# Patient Record
Sex: MALE | Race: WHITE | Employment: OTHER | URBAN - METROPOLITAN AREA
[De-identification: names, ages, dates, MRNs, and addresses within clinical notes are randomized per-mention and may not be internally consistent; named-entity substitution may affect disease eponyms.]

---

## 2017-01-23 ENCOUNTER — TELEPHONE (OUTPATIENT)
Dept: FAMILY MEDICINE CLINIC | Facility: CLINIC | Age: 67
End: 2017-01-23

## 2017-01-23 RX ORDER — NAPROXEN SODIUM 220 MG
1-2 TABLET ORAL AS NEEDED
COMMUNITY
Start: 2008-09-02

## 2017-01-23 RX ORDER — LISINOPRIL 20 MG/1
20 TABLET ORAL 2 TIMES DAILY
COMMUNITY
Start: 2016-03-09 | End: 2017-05-24

## 2017-01-23 NOTE — TELEPHONE ENCOUNTER
Humalog Flexpen is not on formulary. New Medicare plan does not cover. Please change to Humalog Kwikpen.

## 2017-01-24 NOTE — TELEPHONE ENCOUNTER
Pharmacy call with error on script. Script stated 4 times per day. Med was corrected to 45 units with sliding scale per day.

## 2017-02-23 ENCOUNTER — LAB ENCOUNTER (OUTPATIENT)
Dept: LAB | Age: 67
End: 2017-02-23
Attending: FAMILY MEDICINE
Payer: MEDICARE

## 2017-02-23 DIAGNOSIS — E11.9 DIABETES MELLITUS TYPE II, CONTROLLED (HCC): Primary | ICD-10-CM

## 2017-02-23 LAB
EST. AVERAGE GLUCOSE BLD GHB EST-MCNC: 134 MG/DL (ref 68–126)
HBA1C MFR BLD HPLC: 6.3 % (ref ?–5.7)

## 2017-02-23 PROCEDURE — 83036 HEMOGLOBIN GLYCOSYLATED A1C: CPT

## 2017-02-24 ENCOUNTER — TELEPHONE (OUTPATIENT)
Dept: FAMILY MEDICINE CLINIC | Facility: CLINIC | Age: 67
End: 2017-02-24

## 2017-02-24 NOTE — TELEPHONE ENCOUNTER
----- Message from Stephanie Hinkle MD sent at 2/24/2017  7:24 AM CST -----  A1c is good at 6.3. Please notify patient.

## 2017-03-17 ENCOUNTER — OFFICE VISIT (OUTPATIENT)
Dept: FAMILY MEDICINE CLINIC | Facility: CLINIC | Age: 67
End: 2017-03-17

## 2017-03-17 VITALS
TEMPERATURE: 97 F | WEIGHT: 183 LBS | SYSTOLIC BLOOD PRESSURE: 140 MMHG | HEART RATE: 80 BPM | BODY MASS INDEX: 27.11 KG/M2 | HEIGHT: 69 IN | DIASTOLIC BLOOD PRESSURE: 72 MMHG | RESPIRATION RATE: 16 BRPM

## 2017-03-17 DIAGNOSIS — J40 BRONCHITIS: Primary | ICD-10-CM

## 2017-03-17 PROBLEM — M86.9 OSTEOMYELITIS (HCC): Status: ACTIVE | Noted: 2017-03-17

## 2017-03-17 PROBLEM — M99.79 NARROWING OF INTERVERTEBRAL DISC SPACE: Status: ACTIVE | Noted: 2017-03-17

## 2017-03-17 PROCEDURE — 99214 OFFICE O/P EST MOD 30 MIN: CPT | Performed by: FAMILY MEDICINE

## 2017-03-17 RX ORDER — FLUTICASONE PROPIONATE 50 MCG
2 SPRAY, SUSPENSION (ML) NASAL DAILY
COMMUNITY
End: 2017-04-25

## 2017-03-17 RX ORDER — AZITHROMYCIN 250 MG/1
TABLET, FILM COATED ORAL
Qty: 6 TABLET | Refills: 0 | Status: SHIPPED | OUTPATIENT
Start: 2017-03-17 | End: 2017-05-24

## 2017-03-17 NOTE — PROGRESS NOTES
[facility] [place]  PROGRESS NOTE  Chief Complaint:   Patient presents with:  Nasal Congestion  Cough  Sore Throat      HPI:   This is a 78year old male coming in for productive cough and head congestion for the past week. No fever chills.   No dryness.   CARDIOVASCULAR:  Denies chest pain, chest pressure, chest discomfort, palpitations, edema, dyspnea on exertion or at rest.  RESPIRATORY:  See HPI  GASTROINTESTINAL:  Denies abdominal pain, nausea, vomiting, constipation, diarrhea, or blood in sto Depression Screen due on [birth year]02/1950  Diabetes Care Foot Exam due on 03[birth year]02/1950  LDL Control due on 03/02/1950  Diabetes Care Dilated Eye Exam due on 03[birth year]02/1951  Annual Physical due on 03/02/1953  Tobacco Cessation Counseling 2 Years due on 03/02/1963  FIT

## 2017-04-25 NOTE — TELEPHONE ENCOUNTER
Future Appointments  Date Time Provider Kamran Mcdonnell   5/22/2017 8:45 AM REF [place] REF [facility]   5/24/2017 8:00 AM Rogelio Sampson MD [facility] [place] [facility], 04/25/2017, 10:34 AM

## 2017-04-26 RX ORDER — FLUTICASONE PROPIONATE 50 MCG
SPRAY, SUSPENSION (ML) NASAL
Qty: 48 G | Refills: 12 | Status: SHIPPED | OUTPATIENT
Start: 2017-04-26 | End: 2018-10-04

## 2017-05-19 ENCOUNTER — TELEPHONE (OUTPATIENT)
Dept: FAMILY MEDICINE CLINIC | Facility: CLINIC | Age: 67
End: 2017-05-19

## 2017-05-19 DIAGNOSIS — E11.9 CONTROLLED TYPE 2 DIABETES MELLITUS WITHOUT COMPLICATION, WITHOUT LONG-TERM CURRENT USE OF INSULIN (HCC): ICD-10-CM

## 2017-05-19 DIAGNOSIS — Z00.00 HEALTH CARE MAINTENANCE: Primary | ICD-10-CM

## 2017-05-19 NOTE — TELEPHONE ENCOUNTER
----- Message from Sumaya Mcclure sent at 5/19/2017  1:26 PM CDT -----  Regarding: lab orders needed   Patient has lab appointment on 5/22/17 could you please put lab orders in system.         Thanks,   Margo

## 2017-05-19 NOTE — TELEPHONE ENCOUNTER
Please see note from lab. Lab orders needed.     Future Appointments  Date Time Provider Kamran Kecia   5/22/2017 8:45 AM REF [place] REF [facility]   5/24/2017 8:00 AM Kirstin Grullon MD [facility] [place] [facility] [place] [facility] [place]

## 2017-05-20 NOTE — TELEPHONE ENCOUNTER
----- Message from Elodia Jaramillo sent at 5/19/2017  1:26 PM CDT -----  Regarding: lab orders needed   Patient has lab appointment on 5/22/17 could you please put lab orders in system.         Thanks,   Margo

## 2017-05-22 ENCOUNTER — LAB ENCOUNTER (OUTPATIENT)
Dept: LAB | Age: 67
End: 2017-05-22
Attending: FAMILY MEDICINE
Payer: MEDICARE

## 2017-05-22 DIAGNOSIS — E11.9 CONTROLLED TYPE 2 DIABETES MELLITUS WITHOUT COMPLICATION, WITHOUT LONG-TERM CURRENT USE OF INSULIN (HCC): ICD-10-CM

## 2017-05-22 DIAGNOSIS — Z00.00 HEALTH CARE MAINTENANCE: ICD-10-CM

## 2017-05-22 PROCEDURE — 82043 UR ALBUMIN QUANTITATIVE: CPT

## 2017-05-22 PROCEDURE — 80061 LIPID PANEL: CPT

## 2017-05-22 PROCEDURE — 82570 ASSAY OF URINE CREATININE: CPT

## 2017-05-22 PROCEDURE — 36415 COLL VENOUS BLD VENIPUNCTURE: CPT

## 2017-05-22 PROCEDURE — 85025 COMPLETE CBC W/AUTO DIFF WBC: CPT

## 2017-05-22 PROCEDURE — 83036 HEMOGLOBIN GLYCOSYLATED A1C: CPT

## 2017-05-22 PROCEDURE — 80053 COMPREHEN METABOLIC PANEL: CPT

## 2017-05-22 PROCEDURE — 81003 URINALYSIS AUTO W/O SCOPE: CPT

## 2017-05-22 PROCEDURE — 84443 ASSAY THYROID STIM HORMONE: CPT

## 2017-05-24 ENCOUNTER — OFFICE VISIT (OUTPATIENT)
Dept: FAMILY MEDICINE CLINIC | Facility: CLINIC | Age: 67
End: 2017-05-24

## 2017-05-24 VITALS
DIASTOLIC BLOOD PRESSURE: 74 MMHG | RESPIRATION RATE: 16 BRPM | HEART RATE: 86 BPM | BODY MASS INDEX: 27 KG/M2 | TEMPERATURE: 97 F | WEIGHT: 182.5 LBS | SYSTOLIC BLOOD PRESSURE: 140 MMHG

## 2017-05-24 DIAGNOSIS — I10 ESSENTIAL HYPERTENSION: ICD-10-CM

## 2017-05-24 DIAGNOSIS — Z00.00 ENCOUNTER FOR ANNUAL HEALTH EXAMINATION: ICD-10-CM

## 2017-05-24 DIAGNOSIS — Z00.00 HEALTH CARE MAINTENANCE: Primary | ICD-10-CM

## 2017-05-24 DIAGNOSIS — E11.9 CONTROLLED TYPE 2 DIABETES MELLITUS WITHOUT COMPLICATION, WITHOUT LONG-TERM CURRENT USE OF INSULIN (HCC): ICD-10-CM

## 2017-05-24 DIAGNOSIS — J30.9 ALLERGIC RHINITIS, UNSPECIFIED ALLERGIC RHINITIS TRIGGER, UNSPECIFIED RHINITIS SEASONALITY: ICD-10-CM

## 2017-05-24 PROCEDURE — 90732 PPSV23 VACC 2 YRS+ SUBQ/IM: CPT | Performed by: FAMILY MEDICINE

## 2017-05-24 PROCEDURE — 96160 PT-FOCUSED HLTH RISK ASSMT: CPT | Performed by: FAMILY MEDICINE

## 2017-05-24 PROCEDURE — G0438 PPPS, INITIAL VISIT: HCPCS | Performed by: FAMILY MEDICINE

## 2017-05-24 PROCEDURE — 99397 PER PM REEVAL EST PAT 65+ YR: CPT | Performed by: FAMILY MEDICINE

## 2017-05-24 PROCEDURE — G0009 ADMIN PNEUMOCOCCAL VACCINE: HCPCS | Performed by: FAMILY MEDICINE

## 2017-05-24 RX ORDER — LISINOPRIL 20 MG/1
20 TABLET ORAL 2 TIMES DAILY
Qty: 180 TABLET | Refills: 3 | Status: SHIPPED | OUTPATIENT
Start: 2017-05-24 | End: 2018-11-16

## 2017-05-24 NOTE — PROGRESS NOTES
[facility] [place]  PROGRESS NOTE  Chief Complaint:   No chief complaint on file. HPI:   This is a 78year old male coming in for general wellness check and recheck of problems. Overall feeling well.   Diabetes: A1c is improved to 6.1, was 0.350-5.500 mIU/mL   -PSA SCREEN   Result Value Ref Range   Prostate Specific Antigen Screen 1.42 0.01-4.00 ng/mL   -URINALYSIS, ROUTINE   Result Value Ref Range   Urine Color Yellow Yellow   Clarity Urine Hazy (A) Clear   Spec Gravity 1.010 1.001-1.030 Meds:    Current Outpatient Prescriptions:  ONETOUCH LANCETS Does not apply Misc Checking blood sugar 6-8 times a day Disp: 500 each Rfl: 3   lisinopril 20 MG Oral Tab Take 1 tablet (20 mg total) by mouth 2 (two) times daily.  Disp: 180 tablet Rfl: 3   Insu mmHg  Pulse 86  Temp(Src) 97.3 °F (36.3 °C) (Temporal)  Resp 16  Wt 182 lb 8 oz Estimated body mass index is 26.94 kg/(m^2) as calculated from the following:    Height as of 3/17/17: 69\". Weight as of this encounter: 182 lb 8 oz.    Vital signs reviewed Subcutaneous Solution Pen-injector; Inject 45 Units into the skin daily. Sliding scale up to 45 units per day  -     Pneumococcal 23, Adult (Pneumovax) (04846) (Dx V03.82/Z23)        PLAN: Exam is normal.  Continue same medications and healthy lifestyle.

## 2017-05-24 NOTE — PATIENT INSTRUCTIONS
Keven Phillip's SCREENING SCHEDULE   Tests on this list are recommended by your physician but may not be covered, or covered at this frequency, by your insurer. Please check with your insurance carrier before scheduling to verify coverage.     PREVENTATI years- more often if abnormal Colonoscopy,10 Years due on 03/02/2001 Update [facility] if applicable    Flex Sigmoidoscopy Screen  Covered every 5 years No results found for this or any previous visit. No flowsheet data found.      Fecal Occult [place] prescription benefits, but Medicare does not cover unless Medically needed    Zoster (Not covered by Medicare Part B) No orders found for this or any previous visit.  This may be covered with your pharmacy  prescription benefits     Recommended Websites for

## 2017-06-07 RX ORDER — INSULIN DETEMIR 100 [IU]/ML
INJECTION, SOLUTION SUBCUTANEOUS
Qty: 30 ML | Refills: 10 | Status: SHIPPED | OUTPATIENT
Start: 2017-06-07 | End: 2017-11-13

## 2017-06-07 NOTE — TELEPHONE ENCOUNTER
Future Appointments  Date Time Provider Kamran Mcdonnell   8/24/2017 8:15 AM REF [place] REF [facility]     Return in about 1 year (around 5/24/2018).

## 2017-07-05 ENCOUNTER — TELEPHONE (OUTPATIENT)
Dept: FAMILY MEDICINE CLINIC | Facility: CLINIC | Age: 67
End: 2017-07-05

## 2017-07-05 NOTE — TELEPHONE ENCOUNTER
Pt states he has diarrhea for one week. Usually after he eats. We have no appts today and has schedule an appt for tomorrow. I advised pt to eat a BRAT diet and fluids. Pt has appt tomorrow.

## 2017-07-07 ENCOUNTER — OFFICE VISIT (OUTPATIENT)
Dept: FAMILY MEDICINE CLINIC | Facility: CLINIC | Age: 67
End: 2017-07-07

## 2017-07-07 VITALS
HEART RATE: 100 BPM | TEMPERATURE: 98 F | RESPIRATION RATE: 24 BRPM | BODY MASS INDEX: 26.36 KG/M2 | SYSTOLIC BLOOD PRESSURE: 170 MMHG | WEIGHT: 178 LBS | DIASTOLIC BLOOD PRESSURE: 88 MMHG | HEIGHT: 69 IN

## 2017-07-07 DIAGNOSIS — R19.7 DIARRHEA, UNSPECIFIED TYPE: Primary | ICD-10-CM

## 2017-07-07 PROCEDURE — 99213 OFFICE O/P EST LOW 20 MIN: CPT | Performed by: FAMILY MEDICINE

## 2017-07-07 NOTE — PROGRESS NOTES
[facility] [place]  PROGRESS NOTE  Chief Complaint:   Patient presents with:  Diarrhea      HPI:   This is a 78year old male coming in for diarrhea for the past 10 days. Patient denies any recent travel or new foods.   Has had 2-3 loose stools Negative Negative mg/dl   Bilirubin Urine Negative Negative   Ketones Urine Negative Negative mg/dL   Blood Urine Negative Negative   pH Urine 6.0 4.5 - 8.0   Protein Urine Negative Negative mg/dl   Urobilinogen Urine <2.0 0.2 - 2.0 mg/dL   Nitrite Urine N Rfl: 10   ONETOUCH LANCETS Does not apply Misc Checking blood sugar 6-8 times a day Disp: 500 each Rfl: 3   lisinopril 20 MG Oral Tab Take 1 tablet (20 mg total) by mouth 2 (two) times daily.  Disp: 180 tablet Rfl: 3   Insulin Lispro (HUMALOG KWIKPEN) 100 U OVA AND PARASITE W GIARDIA EIA; Future  -     STOOL CULTURE W/SHIGATOXIN; Future        PLAN: Diarrhea which is most likely caused by viral enteritis. Due to being over 1 week in duration, will plan stool studies. Recheck if no improvement.     [address] [facility]

## 2017-07-18 ENCOUNTER — LAB ENCOUNTER (OUTPATIENT)
Dept: LAB | Age: 67
End: 2017-07-18
Attending: FAMILY MEDICINE
Payer: MEDICARE

## 2017-07-18 DIAGNOSIS — R19.7 DIARRHEA, UNSPECIFIED TYPE: ICD-10-CM

## 2017-07-18 PROCEDURE — 87427 SHIGA-LIKE TOXIN AG IA: CPT | Performed by: FAMILY MEDICINE

## 2017-07-18 PROCEDURE — 87493 C DIFF AMPLIFIED PROBE: CPT | Performed by: FAMILY MEDICINE

## 2017-07-18 PROCEDURE — 87209 SMEAR COMPLEX STAIN: CPT | Performed by: FAMILY MEDICINE

## 2017-07-18 PROCEDURE — 87045 FECES CULTURE AEROBIC BACT: CPT | Performed by: FAMILY MEDICINE

## 2017-07-18 PROCEDURE — 87269 GIARDIA AG IF: CPT | Performed by: FAMILY MEDICINE

## 2017-07-18 PROCEDURE — 87046 STOOL CULTR AEROBIC BACT EA: CPT | Performed by: FAMILY MEDICINE

## 2017-07-18 PROCEDURE — 87177 OVA AND PARASITES SMEARS: CPT | Performed by: FAMILY MEDICINE

## 2017-07-20 LAB
OVA AND PARASITE, TRICHROME STAIN: NEGATIVE
OVA AND PARASITE, WET MOUNT: NEGATIVE

## 2017-07-21 ENCOUNTER — TELEPHONE (OUTPATIENT)
Dept: FAMILY MEDICINE CLINIC | Facility: CLINIC | Age: 67
End: 2017-07-21

## 2017-07-21 RX ORDER — CIPROFLOXACIN 500 MG/1
500 TABLET, FILM COATED ORAL 2 TIMES DAILY
Qty: 14 TABLET | Refills: 0 | Status: SHIPPED | OUTPATIENT
Start: 2017-07-21 | End: 2018-04-14

## 2017-07-21 RX ORDER — CIPROFLOXACIN 500 MG/1
500 TABLET, FILM COATED ORAL 2 TIMES DAILY
Qty: 14 TABLET | Refills: 0 | COMMUNITY
Start: 2017-07-21 | End: 2017-07-21

## 2017-07-21 NOTE — TELEPHONE ENCOUNTER
Informed pt to start abx and call with any concerns. Pt will call out office with any concerns. Pt expressed understanding and thanks.

## 2017-07-21 NOTE — TELEPHONE ENCOUNTER
Due to continued diarrhea would like to try him on Cipro 500 mg twice daily for 7 days. If no improvement with this then would need to see gastroenterologist.  Send prescription to pharmacy of choice.

## 2017-07-21 NOTE — TELEPHONE ENCOUNTER
Informed pt of his stool test results. Pt still has diarrhea especially after he eats. Pt has tried the imodium but it does not stop the diarrhea. What is the next step. Please advise.

## 2017-07-25 ENCOUNTER — TELEPHONE (OUTPATIENT)
Dept: FAMILY MEDICINE CLINIC | Facility: CLINIC | Age: 67
End: 2017-07-25

## 2017-08-21 ENCOUNTER — TELEPHONE (OUTPATIENT)
Dept: FAMILY MEDICINE CLINIC | Facility: CLINIC | Age: 67
End: 2017-08-21

## 2017-08-21 RX ORDER — PEN NEEDLE, DIABETIC 32GX 5/32"
NEEDLE, DISPOSABLE MISCELLANEOUS
Qty: 200 EACH | Refills: 12 | Status: SHIPPED | OUTPATIENT
Start: 2017-08-21 | End: 2017-08-31

## 2017-08-21 RX ORDER — LISINOPRIL 40 MG/1
TABLET ORAL
Qty: 90 TABLET | Refills: 3 | Status: SHIPPED | OUTPATIENT
Start: 2017-08-21 | End: 2018-04-14

## 2017-08-21 NOTE — TELEPHONE ENCOUNTER
Pharmacy wanted to give pt lisinopril 20mg bid instead of the 40mg and pt has to cut in half. Pt has different insurance now. Ok to give 20mg bid instead of the 40mg and cut in half.

## 2017-08-22 ENCOUNTER — TELEPHONE (OUTPATIENT)
Dept: FAMILY MEDICINE CLINIC | Facility: CLINIC | Age: 67
End: 2017-08-22

## 2017-08-22 NOTE — TELEPHONE ENCOUNTER
Pt is up to date with pneumonia vaccines. Had Prevnar last yr and had Pneumovax this yr at his 5/24/17 appt.     Please notify pt

## 2017-08-22 NOTE — TELEPHONE ENCOUNTER
when is he due for his 2nd pneumonia shot ?       OTW ( see spouse's inquiry also )    please advise

## 2017-08-24 ENCOUNTER — TELEPHONE (OUTPATIENT)
Dept: FAMILY MEDICINE CLINIC | Facility: CLINIC | Age: 67
End: 2017-08-24

## 2017-08-24 ENCOUNTER — APPOINTMENT (OUTPATIENT)
Dept: LAB | Age: 67
End: 2017-08-24
Attending: FAMILY MEDICINE
Payer: MEDICARE

## 2017-08-24 DIAGNOSIS — E11.9 CONTROLLED TYPE 2 DIABETES MELLITUS WITHOUT COMPLICATION, WITHOUT LONG-TERM CURRENT USE OF INSULIN (HCC): ICD-10-CM

## 2017-08-24 LAB
EST. AVERAGE GLUCOSE BLD GHB EST-MCNC: 126 MG/DL (ref 68–126)
HBA1C MFR BLD HPLC: 6 % (ref ?–5.7)

## 2017-08-24 PROCEDURE — 83036 HEMOGLOBIN GLYCOSYLATED A1C: CPT | Performed by: FAMILY MEDICINE

## 2017-08-24 PROCEDURE — 36415 COLL VENOUS BLD VENIPUNCTURE: CPT | Performed by: FAMILY MEDICINE

## 2017-08-24 NOTE — TELEPHONE ENCOUNTER
----- Message from Afua Chen MD sent at 8/24/2017  4:37 PM CDT -----  A1c is good at 6.0, was 6.1 and 6.3. Please notify patient.

## 2017-08-28 ENCOUNTER — TELEPHONE (OUTPATIENT)
Dept: FAMILY MEDICINE CLINIC | Facility: CLINIC | Age: 67
End: 2017-08-28

## 2017-08-28 DIAGNOSIS — Z79.4 CONTROLLED TYPE 2 DIABETES MELLITUS WITHOUT COMPLICATION, WITH LONG-TERM CURRENT USE OF INSULIN (HCC): Primary | ICD-10-CM

## 2017-08-28 DIAGNOSIS — E11.9 CONTROLLED TYPE 2 DIABETES MELLITUS WITHOUT COMPLICATION, WITH LONG-TERM CURRENT USE OF INSULIN (HCC): Primary | ICD-10-CM

## 2017-08-31 NOTE — TELEPHONE ENCOUNTER
Pt would like a 90 day supply last office visit 5/24/17  Last bw 8/24/17.     Future Appointments  Date Time Provider Kamran Mcdonnell   11/22/2017 8:00 AM REF [place] [facility]F [facility]

## 2017-10-06 RX ORDER — PEN NEEDLE, DIABETIC 32GX 5/32"
NEEDLE, DISPOSABLE MISCELLANEOUS
Qty: 540 EACH | Refills: 3 | Status: SHIPPED | OUTPATIENT
Start: 2017-10-06 | End: 2018-11-15

## 2017-10-06 NOTE — TELEPHONE ENCOUNTER
Future appt:     Your appointments     Date & Time Appointment Department [facility])    Nov 22, 2017  8:00 AM CST Laboratory Visit with REF Tarun Rivero [facility] ([facility] Josh [facility])        Nas[facility] Edouard [facility]  [facility]  [address] [place] [address]

## 2017-11-13 ENCOUNTER — TELEPHONE (OUTPATIENT)
Dept: FAMILY MEDICINE CLINIC | Facility: CLINIC | Age: 67
End: 2017-11-13

## 2017-11-13 NOTE — TELEPHONE ENCOUNTER
Schedule at flu injection appt. Called pharmacy and ok for a 3 month supply of insulin. Pt expressed understanding and thanks.

## 2017-11-13 NOTE — TELEPHONE ENCOUNTER
Pt would like to increase his insulin to a 3 month supply. Pt just ordered insulin and wants me to call to get a 90 day supply. Please advise.

## 2017-11-13 NOTE — TELEPHONE ENCOUNTER
Tell patient that he was seen last year for physical and had Prevnar vaccine.   He needs Pneumovax vaccine this year and is due for physical.  Ejection at the time of the physical.  Please notify patient

## 2017-11-22 ENCOUNTER — APPOINTMENT (OUTPATIENT)
Dept: LAB | Age: 67
End: 2017-11-22
Attending: FAMILY MEDICINE
Payer: MEDICARE

## 2017-11-22 ENCOUNTER — NURSE ONLY (OUTPATIENT)
Dept: FAMILY MEDICINE CLINIC | Facility: CLINIC | Age: 67
End: 2017-11-22

## 2017-11-22 ENCOUNTER — TELEPHONE (OUTPATIENT)
Dept: FAMILY MEDICINE CLINIC | Facility: CLINIC | Age: 67
End: 2017-11-22

## 2017-11-22 DIAGNOSIS — E11.9 CONTROLLED TYPE 2 DIABETES MELLITUS WITHOUT COMPLICATION, WITHOUT LONG-TERM CURRENT USE OF INSULIN (HCC): Primary | ICD-10-CM

## 2017-11-22 DIAGNOSIS — E11.9 CONTROLLED TYPE 2 DIABETES MELLITUS WITHOUT COMPLICATION, WITH LONG-TERM CURRENT USE OF INSULIN (HCC): ICD-10-CM

## 2017-11-22 DIAGNOSIS — Z79.4 CONTROLLED TYPE 2 DIABETES MELLITUS WITHOUT COMPLICATION, WITH LONG-TERM CURRENT USE OF INSULIN (HCC): ICD-10-CM

## 2017-11-22 PROCEDURE — 83036 HEMOGLOBIN GLYCOSYLATED A1C: CPT | Performed by: FAMILY MEDICINE

## 2017-11-22 PROCEDURE — 90653 IIV ADJUVANT VACCINE IM: CPT | Performed by: FAMILY MEDICINE

## 2017-11-22 PROCEDURE — 36415 COLL VENOUS BLD VENIPUNCTURE: CPT | Performed by: FAMILY MEDICINE

## 2017-11-22 PROCEDURE — G0008 ADMIN INFLUENZA VIRUS VAC: HCPCS | Performed by: FAMILY MEDICINE

## 2017-11-22 NOTE — TELEPHONE ENCOUNTER
Your appointments     Date & Time Appointment Department [facility])    Apr 09, 2018  8:00 AM CDT Laboratory Visit with REF Naheed Fatima [facility] ([facility] Ref Lab Paul [facility])    May 14, 2018  8:15 AM CDT Laboratory Visit with [address]Adrian Kapoor L

## 2017-11-24 ENCOUNTER — TELEPHONE (OUTPATIENT)
Dept: FAMILY MEDICINE CLINIC | Facility: CLINIC | Age: 67
End: 2017-11-24

## 2017-11-24 DIAGNOSIS — N40.1 BENIGN PROSTATIC HYPERPLASIA WITH LOWER URINARY TRACT SYMPTOMS, SYMPTOM DETAILS UNSPECIFIED: ICD-10-CM

## 2017-11-24 DIAGNOSIS — E11.9 CONTROLLED TYPE 2 DIABETES MELLITUS WITHOUT COMPLICATION, WITHOUT LONG-TERM CURRENT USE OF INSULIN (HCC): Primary | ICD-10-CM

## 2017-11-24 DIAGNOSIS — I10 ESSENTIAL HYPERTENSION: ICD-10-CM

## 2017-11-24 NOTE — TELEPHONE ENCOUNTER
Your appointments     Date & Time Appointment Department [facility])          May 14, 2018  8:15 AM CDT Laboratory Visit with REF Mary Santoyo [facility] ([facility] Ref [facility])    May 16, 2018  8:30 AM CDT MA Supervisit with MD Greg Frye

## 2017-11-24 NOTE — TELEPHONE ENCOUNTER
----- Message from Dilma Dominguez MD sent at 11/24/2017  8:57 AM CST -----  A1c is same at 6.0 which is very good. Repeat in 3 months.   Notify pt

## 2018-04-14 ENCOUNTER — APPOINTMENT (OUTPATIENT)
Dept: LAB | Age: 68
End: 2018-04-14
Attending: FAMILY MEDICINE
Payer: MEDICARE

## 2018-04-14 ENCOUNTER — OFFICE VISIT (OUTPATIENT)
Dept: FAMILY MEDICINE CLINIC | Facility: CLINIC | Age: 68
End: 2018-04-14

## 2018-04-14 VITALS
WEIGHT: 185.63 LBS | TEMPERATURE: 98 F | BODY MASS INDEX: 27 KG/M2 | DIASTOLIC BLOOD PRESSURE: 70 MMHG | HEART RATE: 90 BPM | OXYGEN SATURATION: 96 % | SYSTOLIC BLOOD PRESSURE: 140 MMHG

## 2018-04-14 DIAGNOSIS — J40 BRONCHITIS: ICD-10-CM

## 2018-04-14 DIAGNOSIS — E11.9 CONTROLLED TYPE 2 DIABETES MELLITUS WITHOUT COMPLICATION, WITHOUT LONG-TERM CURRENT USE OF INSULIN (HCC): ICD-10-CM

## 2018-04-14 DIAGNOSIS — J01.10 ACUTE NON-RECURRENT FRONTAL SINUSITIS: Primary | ICD-10-CM

## 2018-04-14 PROCEDURE — 99213 OFFICE O/P EST LOW 20 MIN: CPT | Performed by: NURSE PRACTITIONER

## 2018-04-14 PROCEDURE — 36415 COLL VENOUS BLD VENIPUNCTURE: CPT | Performed by: FAMILY MEDICINE

## 2018-04-14 PROCEDURE — 83036 HEMOGLOBIN GLYCOSYLATED A1C: CPT | Performed by: FAMILY MEDICINE

## 2018-04-14 RX ORDER — AMOXICILLIN AND CLAVULANATE POTASSIUM 875; 125 MG/1; MG/1
1 TABLET, FILM COATED ORAL 2 TIMES DAILY
Qty: 20 TABLET | Refills: 0 | Status: SHIPPED | OUTPATIENT
Start: 2018-04-14 | End: 2018-04-24

## 2018-04-14 NOTE — PATIENT INSTRUCTIONS
Rest, increase fluids, salt water gargles ,anirudh pot (use distilled water) or saline nasal spray, Mucinex-DM,  Tylenol/Ibuprofen, follow up if symptoms persist or increase.

## 2018-04-14 NOTE — PROGRESS NOTES
CHIEF COMPLAINT:   Patient presents with:  Cough  Body ache and/or chills  Cold      HPI:   Kim Anguiano is a 80year old male who presents to clinic today with complaints of feeling ill for 2.5 weeks- started with cough-   Was in [place]- there were w Years: 0.00         Types: Cigarettes  Smokeless tobacco: Never Used                      Alcohol use:  No                 REVIEW OF SYSTEMS:   GENERAL: See HPI  SKIN: no unusual skin lesions or rashes  HEENT: See HPI  THYROID: normal size, no nodules  NIKO completing full course of antibiotic. Meds & Refills for this Visit:    Signed Prescriptions Disp Refills    Amoxicillin-Pot Clavulanate 875-125 MG Oral Tab 20 tablet 0      Sig: Take 1 tablet by mouth 2 (two) times daily.               Aaliyah Cortes

## 2018-04-18 NOTE — TELEPHONE ENCOUNTER
Tell patient that he just had an A1c done on 4/14/2018. He is coming in next month for physical.  Tell him that we could wait until July 14 to do full labs along with A1c at that time rather than doing routine labs next month.   If this would work out pau

## 2018-04-18 NOTE — TELEPHONE ENCOUNTER
Pt would like to have all his blood done in May (except A1C). Pt will be leaving for [place] soon and not sure if they will be back by July. Please place orders.

## 2018-05-01 ENCOUNTER — TELEPHONE (OUTPATIENT)
Dept: FAMILY MEDICINE CLINIC | Facility: CLINIC | Age: 68
End: 2018-05-01

## 2018-05-01 DIAGNOSIS — Z00.00 HEALTH CARE MAINTENANCE: ICD-10-CM

## 2018-05-01 DIAGNOSIS — E11.9 CONTROLLED TYPE 2 DIABETES MELLITUS WITHOUT COMPLICATION, WITH LONG-TERM CURRENT USE OF INSULIN (HCC): Primary | ICD-10-CM

## 2018-05-01 DIAGNOSIS — Z79.4 CONTROLLED TYPE 2 DIABETES MELLITUS WITHOUT COMPLICATION, WITH LONG-TERM CURRENT USE OF INSULIN (HCC): Primary | ICD-10-CM

## 2018-05-01 NOTE — TELEPHONE ENCOUNTER
Your appointments     Date & Time Appointment Department [facility])    May 14, 2018  8:15 AM CDT Laboratory Visit with REF Beatris Maxwell [facility] ([facility] Ref [facility])    May 16, 2018  2:30 PM CDT MA Supervisit with Drew Hodgson  [facility]

## 2018-05-15 ENCOUNTER — LABORATORY ENCOUNTER (OUTPATIENT)
Dept: LAB | Age: 68
End: 2018-05-15
Attending: FAMILY MEDICINE
Payer: MEDICARE

## 2018-05-15 DIAGNOSIS — E11.9 CONTROLLED TYPE 2 DIABETES MELLITUS WITHOUT COMPLICATION, WITH LONG-TERM CURRENT USE OF INSULIN (HCC): ICD-10-CM

## 2018-05-15 DIAGNOSIS — N40.1 BENIGN PROSTATIC HYPERPLASIA WITH LOWER URINARY TRACT SYMPTOMS, SYMPTOM DETAILS UNSPECIFIED: ICD-10-CM

## 2018-05-15 DIAGNOSIS — E11.9 CONTROLLED TYPE 2 DIABETES MELLITUS WITHOUT COMPLICATION, WITHOUT LONG-TERM CURRENT USE OF INSULIN (HCC): ICD-10-CM

## 2018-05-15 DIAGNOSIS — Z00.00 HEALTH CARE MAINTENANCE: ICD-10-CM

## 2018-05-15 DIAGNOSIS — I10 ESSENTIAL HYPERTENSION: ICD-10-CM

## 2018-05-15 DIAGNOSIS — Z79.4 CONTROLLED TYPE 2 DIABETES MELLITUS WITHOUT COMPLICATION, WITH LONG-TERM CURRENT USE OF INSULIN (HCC): ICD-10-CM

## 2018-05-15 PROCEDURE — 80061 LIPID PANEL: CPT

## 2018-05-15 PROCEDURE — 82306 VITAMIN D 25 HYDROXY: CPT

## 2018-05-15 PROCEDURE — 82570 ASSAY OF URINE CREATININE: CPT

## 2018-05-15 PROCEDURE — 82043 UR ALBUMIN QUANTITATIVE: CPT

## 2018-05-15 PROCEDURE — 84153 ASSAY OF PSA TOTAL: CPT

## 2018-05-15 PROCEDURE — 80053 COMPREHEN METABOLIC PANEL: CPT

## 2018-05-15 PROCEDURE — 85025 COMPLETE CBC W/AUTO DIFF WBC: CPT

## 2018-05-15 PROCEDURE — 84443 ASSAY THYROID STIM HORMONE: CPT

## 2018-05-15 PROCEDURE — 81003 URINALYSIS AUTO W/O SCOPE: CPT

## 2018-05-15 PROCEDURE — 36415 COLL VENOUS BLD VENIPUNCTURE: CPT

## 2018-05-16 ENCOUNTER — OFFICE VISIT (OUTPATIENT)
Dept: FAMILY MEDICINE CLINIC | Facility: CLINIC | Age: 68
End: 2018-05-16

## 2018-05-16 VITALS
HEIGHT: 69 IN | BODY MASS INDEX: 26.69 KG/M2 | RESPIRATION RATE: 16 BRPM | DIASTOLIC BLOOD PRESSURE: 78 MMHG | TEMPERATURE: 98 F | HEART RATE: 84 BPM | WEIGHT: 180.19 LBS | SYSTOLIC BLOOD PRESSURE: 140 MMHG

## 2018-05-16 DIAGNOSIS — Z79.4 CONTROLLED TYPE 2 DIABETES MELLITUS WITHOUT COMPLICATION, WITH LONG-TERM CURRENT USE OF INSULIN (HCC): ICD-10-CM

## 2018-05-16 DIAGNOSIS — Z00.00 ENCOUNTER FOR ANNUAL HEALTH EXAMINATION: ICD-10-CM

## 2018-05-16 DIAGNOSIS — I10 ESSENTIAL HYPERTENSION: ICD-10-CM

## 2018-05-16 DIAGNOSIS — Z00.00 HEALTH CARE MAINTENANCE: Primary | ICD-10-CM

## 2018-05-16 DIAGNOSIS — I73.9 CLAUDICATION OF BOTH LOWER EXTREMITIES (HCC): ICD-10-CM

## 2018-05-16 DIAGNOSIS — N40.1 BENIGN PROSTATIC HYPERPLASIA WITH LOWER URINARY TRACT SYMPTOMS, SYMPTOM DETAILS UNSPECIFIED: ICD-10-CM

## 2018-05-16 DIAGNOSIS — E11.9 CONTROLLED TYPE 2 DIABETES MELLITUS WITHOUT COMPLICATION, WITH LONG-TERM CURRENT USE OF INSULIN (HCC): ICD-10-CM

## 2018-05-16 DIAGNOSIS — J01.10 ACUTE NON-RECURRENT FRONTAL SINUSITIS: ICD-10-CM

## 2018-05-16 PROBLEM — M86.9 OSTEOMYELITIS (HCC): Status: RESOLVED | Noted: 2017-03-17 | Resolved: 2018-05-16

## 2018-05-16 PROCEDURE — 99397 PER PM REEVAL EST PAT 65+ YR: CPT | Performed by: FAMILY MEDICINE

## 2018-05-16 PROCEDURE — G0439 PPPS, SUBSEQ VISIT: HCPCS | Performed by: FAMILY MEDICINE

## 2018-05-16 PROCEDURE — 96160 PT-FOCUSED HLTH RISK ASSMT: CPT | Performed by: FAMILY MEDICINE

## 2018-05-16 NOTE — PATIENT INSTRUCTIONS
Continue with same medications. Arterial Doppler ordered. To try Claritin antihistamine along with Flonase. To stop smoking completely.     Christiano Phillip's SCREENING SCHEDULE   Tests on this list are recommended by your physician but may not be covered, cigarettes in their lifetime   • Anyone with a family history    Colorectal Cancer Screening Covered up to Age 76     Colonoscopy Screen   Covered every 10 years- more often if abnormal Colonoscopy,10 Years due on 03/02/2001 Update Health Maintenance if ap with a cut with metal- TD and TDaP Not covered by Medicare Part B) No orders found for this or any previous visit.  This may be covered with your prescription benefits, but Medicare does not cover unless Medically needed    Zoster (Not covered by Medicare P

## 2018-05-16 NOTE — PROGRESS NOTES
[facility] [place]  PROGRESS NOTE  Chief Complaint:   Patient presents with:  Physical      HPI:   This is a 80year old male coming in for general wellness and recheck of problems.   Patient continues to have fairly good control of his diabetes mg/dL   -ASSAY, THYROID STIM HORMONE   Result Value Ref Range   TSH 4.690 0.350 - 5.500 mIU/mL   -PSA   Result Value Ref Range   PSA 1.74 0.01 - 4.00 ng/mL   -MICROALB/CREAT RATIO, RANDOM URINE   Result Value Ref Range   Microalbumin, Urine 0.57 mg/dL   Cr Monocyte % 6.3 %   Eosinophil % 2.7 %   Basophil % 0.8 %   Immature Granulocyte % 0.3 %       Past Medical History:   Diagnosis Date   • Allergic rhinitis    • Diabetes ([facility] [address]    • Hypertension    • Smoking history      Past Surgical History:  2005: Lora Arana quit: Not Answered  Counseling given: Not Answered       REVIEW OF SYSTEMS:   CONSTITUTIONAL:  Denies unusual weight gain/loss,  EENT:  Eyes:  Denies eye pain, visual loss, blurred vision, double vision or yellow sclerae.  Ears, Nose, Throat:  Denies hearin good turgor. HEART:  Regular rate and rhythm, no murmurs, rubs or gallops. LUNGS: Clear to auscultation bilterally, no rales/rhonchi/wheezing. CHEST: No tenderness. ABDOMEN:  Soft, nondistended, nontender, no masses, no hepatosplenomegaly.   BACK: No te Care Foot Exam due on [birth year]02/1950  Diabetes Care Dilated Eye Exam due on 03[birth year]02/1951  Tobacco Cessation Counseling 2 Years due on 03/02/1963  FIT Colorectal Screening due on 03/02/2001  Colonoscopy,10 Years due on 03/02/2001  Fall Risk Screening due on 05/24

## 2018-05-18 ENCOUNTER — TELEPHONE (OUTPATIENT)
Dept: FAMILY MEDICINE CLINIC | Facility: CLINIC | Age: 68
End: 2018-05-18

## 2018-05-18 NOTE — TELEPHONE ENCOUNTER
Arterial Doppler of both legs show significant arterial narrowing. Discussed with patient. Patient will decide on specialists.

## 2018-05-18 NOTE — TELEPHONE ENCOUNTER
----- Message from Lauro Guzman sent at 5/18/2018  2:59 PM CDT -----  [facility]    Which Dr Oshea Miser >??three in phone book    And has another question you can help her with.    Please call        [address] , 05/18/18, 3:01 PM

## 2018-05-21 ENCOUNTER — TELEPHONE (OUTPATIENT)
Dept: FAMILY MEDICINE CLINIC | Facility: CLINIC | Age: 68
End: 2018-05-21

## 2018-05-21 NOTE — TELEPHONE ENCOUNTER
Please call Dr. Ryder Thorne office. This patient would like to be seen by him for his peripheral vascular disease. Patient will need intervention most likely as he has quite severe vascular disease in his legs. (Had recent arterial Doppler).   Please ask the

## 2018-05-21 NOTE — TELEPHONE ENCOUNTER
Pt wanted an appt with Dr. Karla Gooden. The next availabe appt was 6/28/18 at 10:00. Pt could have seen Dr. Monty Reid tomorrow or next week but pt wanted to see Dr. Karla Gooden. Schedule appt with Dr. Karla Gooden but pt was also put on waiting list for someone to cancel.

## 2018-05-31 ENCOUNTER — TELEPHONE (OUTPATIENT)
Dept: FAMILY MEDICINE CLINIC | Facility: CLINIC | Age: 68
End: 2018-05-31

## 2018-05-31 NOTE — TELEPHONE ENCOUNTER
Macario José stopped in. Pt has appt with Dr. Linwood Villasenor on 6/28/18. Macario José would like to know should pt see another cardiologist sooner. Pt would like to know your opinion. OTW for 6/28/18 or see another cardiologist sooner. Please advise.

## 2018-05-31 NOTE — TELEPHONE ENCOUNTER
Okay to wait to the end of the month for his cardiology appointment. Continue stay active and walk as much as possible.

## 2018-06-14 ENCOUNTER — TELEPHONE (OUTPATIENT)
Dept: FAMILY MEDICINE CLINIC | Facility: CLINIC | Age: 68
End: 2018-06-14

## 2018-06-14 NOTE — TELEPHONE ENCOUNTER
Company wanted to know if we received the fax for supplies. When asking they had the wrong fax number. Provided correct fax number and also informed them Dr. Vasu Almanza is out of the office.

## 2018-08-13 ENCOUNTER — TELEPHONE (OUTPATIENT)
Dept: FAMILY MEDICINE CLINIC | Facility: CLINIC | Age: 68
End: 2018-08-13

## 2018-08-13 DIAGNOSIS — E11.9 CONTROLLED TYPE 2 DIABETES MELLITUS WITHOUT COMPLICATION, WITH LONG-TERM CURRENT USE OF INSULIN (HCC): Primary | ICD-10-CM

## 2018-08-13 DIAGNOSIS — Z79.4 CONTROLLED TYPE 2 DIABETES MELLITUS WITHOUT COMPLICATION, WITH LONG-TERM CURRENT USE OF INSULIN (HCC): Primary | ICD-10-CM

## 2018-08-13 NOTE — TELEPHONE ENCOUNTER
----- Message from Dru Mckeon sent at 8/13/2018  2:19 PM CDT -----  Regarding: lab orders needed   Patient has lab appointment on 8/15/18 could you please put lab orders in system.           Thanks,  Margo

## 2018-08-15 ENCOUNTER — APPOINTMENT (OUTPATIENT)
Dept: LAB | Age: 68
End: 2018-08-15
Attending: FAMILY MEDICINE
Payer: MEDICARE

## 2018-08-15 DIAGNOSIS — Z79.4 CONTROLLED TYPE 2 DIABETES MELLITUS WITHOUT COMPLICATION, WITH LONG-TERM CURRENT USE OF INSULIN (HCC): ICD-10-CM

## 2018-08-15 DIAGNOSIS — E11.9 CONTROLLED TYPE 2 DIABETES MELLITUS WITHOUT COMPLICATION, WITH LONG-TERM CURRENT USE OF INSULIN (HCC): ICD-10-CM

## 2018-08-15 LAB
EST. AVERAGE GLUCOSE BLD GHB EST-MCNC: 131 MG/DL (ref 68–126)
HBA1C MFR BLD HPLC: 6.2 % (ref ?–5.7)

## 2018-08-15 PROCEDURE — 36415 COLL VENOUS BLD VENIPUNCTURE: CPT

## 2018-08-15 PROCEDURE — 83036 HEMOGLOBIN GLYCOSYLATED A1C: CPT

## 2018-08-16 ENCOUNTER — TELEPHONE (OUTPATIENT)
Dept: FAMILY MEDICINE CLINIC | Facility: CLINIC | Age: 68
End: 2018-08-16

## 2018-08-16 DIAGNOSIS — E11.9 CONTROLLED TYPE 2 DIABETES MELLITUS WITHOUT COMPLICATION, WITH LONG-TERM CURRENT USE OF INSULIN (HCC): Primary | ICD-10-CM

## 2018-08-16 DIAGNOSIS — Z79.4 CONTROLLED TYPE 2 DIABETES MELLITUS WITHOUT COMPLICATION, WITH LONG-TERM CURRENT USE OF INSULIN (HCC): Primary | ICD-10-CM

## 2018-08-16 NOTE — TELEPHONE ENCOUNTER
----- Message from Milly Mcdonald MD sent at 8/16/2018  7:04 AM CDT -----  A1c is stable at 6.2, was 6.1. Repeat in 3 months. Lab ordered for 3 months.   Please notify patient

## 2018-08-16 NOTE — TELEPHONE ENCOUNTER
Informed pt of his blood work results. Pt expressed understanding and thanks.     Future Appointments  Date Time Provider Kamran Mcdonnell   11/9/2018 8:00 AM REF [place] REF [facility]

## 2018-08-23 RX ORDER — LISINOPRIL 20 MG/1
TABLET ORAL
Qty: 180 TABLET | Refills: 1 | Status: SHIPPED | OUTPATIENT
Start: 2018-08-23 | End: 2019-03-23

## 2018-08-23 RX ORDER — BLOOD-GLUCOSE METER
1 KIT MISCELLANEOUS 4 TIMES DAILY
Qty: 1 KIT | Refills: 0 | Status: SHIPPED | OUTPATIENT
Start: 2018-08-23 | End: 2019-08-23

## 2018-08-23 NOTE — TELEPHONE ENCOUNTER
Patient is asking for a 90 day supply of the lisinopril. Patient does not have an upcoming appt scheduled     Future appt:     Your appointments     Date & Time Appointment Department [facility])    Nov 09, 2018  8:00 AM CST Laboratory Visit with Mare Walker

## 2018-08-23 NOTE — TELEPHONE ENCOUNTER
Called patient, he is asking for a prescription for a new glucose meter. Asking to get a one touch ultra mini device due to where he is getting his supplies they do not have the kind that he needs.

## 2018-08-23 NOTE — TELEPHONE ENCOUNTER
Prescription for new monitor sent. This patient needs glucose strips for this also? If he does please add this to his prescription list so that it can be sent to the pharmacy.   Should be testing 4 times a day

## 2018-08-24 RX ORDER — BLOOD-GLUCOSE METER
1 KIT MISCELLANEOUS 2 TIMES DAILY
Qty: 1 KIT | Refills: 0 | Status: SHIPPED | OUTPATIENT
Start: 2018-08-24 | End: 2019-08-24

## 2018-10-04 RX ORDER — FLUTICASONE PROPIONATE 50 MCG
SPRAY, SUSPENSION (ML) NASAL
Qty: 48 G | Refills: 3 | Status: SHIPPED | OUTPATIENT
Start: 2018-10-04 | End: 2019-11-11

## 2018-10-04 NOTE — TELEPHONE ENCOUNTER
Future appt:     Your appointments     Date & Time Appointment Department [facility])    Nov 09, 2018  8:00 AM CST Laboratory Visit with REF Wu Elam [facility] ([facility])        Jacob Jha Reference Lab  [facility] Ref [facility] [place]  [address] [place] [address]

## 2018-11-09 ENCOUNTER — APPOINTMENT (OUTPATIENT)
Dept: LAB | Age: 68
End: 2018-11-09
Attending: FAMILY MEDICINE
Payer: MEDICARE

## 2018-11-09 DIAGNOSIS — Z79.4 CONTROLLED TYPE 2 DIABETES MELLITUS WITHOUT COMPLICATION, WITH LONG-TERM CURRENT USE OF INSULIN (HCC): ICD-10-CM

## 2018-11-09 DIAGNOSIS — E11.9 CONTROLLED TYPE 2 DIABETES MELLITUS WITHOUT COMPLICATION, WITH LONG-TERM CURRENT USE OF INSULIN (HCC): ICD-10-CM

## 2018-11-09 PROCEDURE — 83036 HEMOGLOBIN GLYCOSYLATED A1C: CPT

## 2018-11-09 PROCEDURE — 36415 COLL VENOUS BLD VENIPUNCTURE: CPT

## 2018-11-12 ENCOUNTER — TELEPHONE (OUTPATIENT)
Dept: FAMILY MEDICINE CLINIC | Facility: CLINIC | Age: 68
End: 2018-11-12

## 2018-11-12 NOTE — TELEPHONE ENCOUNTER
----- Message from Kathrin Zazueta MD sent at 11/12/2018  6:55 AM CST -----  A1c is excellent at 5.9.   Please notify patient

## 2018-11-15 RX ORDER — PEN NEEDLE, DIABETIC 32GX 5/32"
NEEDLE, DISPOSABLE MISCELLANEOUS
Qty: 540 EACH | Refills: 1 | Status: SHIPPED | OUTPATIENT
Start: 2018-11-15 | End: 2019-06-12

## 2018-11-15 NOTE — TELEPHONE ENCOUNTER
Future appt:     Your appointments     Date & Time Appointment Department [facility])    Nov 16, 2018 11:30 AM CST Exam - Established Patient with Rubi Fernandes MD [facility]        Jay V[place]la

## 2018-11-16 ENCOUNTER — OFFICE VISIT (OUTPATIENT)
Dept: FAMILY MEDICINE CLINIC | Facility: CLINIC | Age: 68
End: 2018-11-16
Payer: COMMERCIAL

## 2018-11-16 ENCOUNTER — TELEPHONE (OUTPATIENT)
Dept: FAMILY MEDICINE CLINIC | Facility: CLINIC | Age: 68
End: 2018-11-16

## 2018-11-16 VITALS
WEIGHT: 188.25 LBS | SYSTOLIC BLOOD PRESSURE: 162 MMHG | HEART RATE: 84 BPM | TEMPERATURE: 97 F | RESPIRATION RATE: 16 BRPM | DIASTOLIC BLOOD PRESSURE: 84 MMHG | BODY MASS INDEX: 27.88 KG/M2 | HEIGHT: 69 IN

## 2018-11-16 DIAGNOSIS — M25.561 ACUTE PAIN OF RIGHT KNEE: ICD-10-CM

## 2018-11-16 DIAGNOSIS — M72.2 PLANTAR FASCIITIS OF RIGHT FOOT: Primary | ICD-10-CM

## 2018-11-16 DIAGNOSIS — M26.609 TMJ (TEMPOROMANDIBULAR JOINT DISORDER): ICD-10-CM

## 2018-11-16 PROCEDURE — 99214 OFFICE O/P EST MOD 30 MIN: CPT | Performed by: FAMILY MEDICINE

## 2018-11-16 RX ORDER — ATORVASTATIN CALCIUM 10 MG/1
1 TABLET, FILM COATED ORAL DAILY
COMMUNITY
Start: 2018-09-23 | End: 2021-03-11

## 2018-11-16 NOTE — PROGRESS NOTES
[facility] [place]  PROGRESS NOTE  Chief Complaint:   Patient presents with:  Heel Pain: Right foot - hurts to walk on      HPI:   This is a 80year old male coming in for several problems.   Patient has had pain over the right heel for the past 8 TIMES DAILYNPI [id number] insulinDX: E11.9 Disp: 700 strip Rfl: 1   BD PEN NEEDLE ANJELICA U/F 32G X 4 MM Does not apply Misc USE 6 TIMES PER DAY Disp: 540 each Rfl: 1   FLUTICASONE PROPIONATE 50 MCG/ACT Nasal Suspension INHALE 2 SPRAYS IN EACH NOSTRIL SAIGE control. HEMATOLOGIC:  Denies anemia, bleeding or bruising. LYMPHATICS:  Denies enlarged nodes   PSYCHIATRIC:  Denies depression or anxiety.       EXAM:   BP (!) 162/84 (BP Location: Left arm, Patient Position: Sitting, Cuff Size: adult)   Pulse 84   Temp due on 05/24/2018  Influenza Vaccine(1) due on 09/01/2018    Patient/Caregiver Education: Patient/Caregiver Education: There are no barriers to learning. Medical education done. Outcome: Patient verbalizes understanding.  Patient is notified to call with

## 2018-11-16 NOTE — TELEPHONE ENCOUNTER
Pt has an appt with Dr. Lieutenant Loza 11/19/18 at 10:30 for knee pain. Left message for pt to return my call.

## 2018-12-05 NOTE — TELEPHONE ENCOUNTER
Future appt:    Last Appointment:  11/16/2018  Cholesterol, Total (mg/dL)   Date Value   05/15/2018 196     HDL Cholesterol (mg/dL)   Date Value   05/15/2018 47     LDL Cholesterol (mg/dL)   Date Value   05/15/2018 130 (H)     Triglycerides (mg/dL)   Date

## 2018-12-14 ENCOUNTER — TELEPHONE (OUTPATIENT)
Dept: FAMILY MEDICINE CLINIC | Facility: CLINIC | Age: 68
End: 2018-12-14

## 2018-12-14 NOTE — TELEPHONE ENCOUNTER
Faxed last px to the fax number provided. Pt is in [place] for 6 months and they need to get a dr down in [place] just in case. Informed wife if they want more records they need to sign a release form and medical records will need take care of this.

## 2019-02-04 ENCOUNTER — TELEPHONE (OUTPATIENT)
Dept: FAMILY MEDICINE CLINIC | Facility: CLINIC | Age: 69
End: 2019-02-04

## 2019-02-04 DIAGNOSIS — I10 ESSENTIAL HYPERTENSION: ICD-10-CM

## 2019-02-04 DIAGNOSIS — E11.9 CONTROLLED TYPE 2 DIABETES MELLITUS WITHOUT COMPLICATION, WITHOUT LONG-TERM CURRENT USE OF INSULIN (HCC): Primary | ICD-10-CM

## 2019-02-04 DIAGNOSIS — R35.1 BENIGN PROSTATIC HYPERPLASIA WITH NOCTURIA: ICD-10-CM

## 2019-02-04 DIAGNOSIS — N40.1 BENIGN PROSTATIC HYPERPLASIA WITH NOCTURIA: ICD-10-CM

## 2019-02-04 NOTE — TELEPHONE ENCOUNTER
Scheduled his appointments, needs orders for his A1C and his fasting lab.   May 20th is his MA Yanick Pruett

## 2019-03-05 ENCOUNTER — APPOINTMENT (OUTPATIENT)
Dept: LAB | Age: 69
End: 2019-03-05
Attending: FAMILY MEDICINE
Payer: MEDICARE

## 2019-03-05 ENCOUNTER — TELEPHONE (OUTPATIENT)
Dept: FAMILY MEDICINE CLINIC | Facility: CLINIC | Age: 69
End: 2019-03-05

## 2019-03-05 DIAGNOSIS — R35.1 BENIGN PROSTATIC HYPERPLASIA WITH NOCTURIA: ICD-10-CM

## 2019-03-05 DIAGNOSIS — N40.1 BENIGN PROSTATIC HYPERPLASIA WITH NOCTURIA: ICD-10-CM

## 2019-03-05 DIAGNOSIS — I10 ESSENTIAL HYPERTENSION: ICD-10-CM

## 2019-03-05 DIAGNOSIS — E11.9 CONTROLLED TYPE 2 DIABETES MELLITUS WITHOUT COMPLICATION, WITHOUT LONG-TERM CURRENT USE OF INSULIN (HCC): ICD-10-CM

## 2019-03-05 DIAGNOSIS — E11.9 CONTROLLED TYPE 2 DIABETES MELLITUS WITHOUT COMPLICATION, WITHOUT LONG-TERM CURRENT USE OF INSULIN (HCC): Primary | ICD-10-CM

## 2019-03-05 LAB
EST. AVERAGE GLUCOSE BLD GHB EST-MCNC: 128 MG/DL (ref 68–126)
HBA1C MFR BLD HPLC: 6.1 % (ref ?–5.7)

## 2019-03-05 PROCEDURE — 36415 COLL VENOUS BLD VENIPUNCTURE: CPT

## 2019-03-05 PROCEDURE — 83036 HEMOGLOBIN GLYCOSYLATED A1C: CPT

## 2019-03-06 ENCOUNTER — TELEPHONE (OUTPATIENT)
Dept: FAMILY MEDICINE CLINIC | Facility: CLINIC | Age: 69
End: 2019-03-06

## 2019-03-06 DIAGNOSIS — I10 ESSENTIAL HYPERTENSION, BENIGN: ICD-10-CM

## 2019-03-06 DIAGNOSIS — E11.9 DIABETES MELLITUS WITHOUT COMPLICATION (HCC): Primary | ICD-10-CM

## 2019-03-06 DIAGNOSIS — R35.1 NOCTURIA: ICD-10-CM

## 2019-03-06 NOTE — TELEPHONE ENCOUNTER
----- Message from Radha Barber MD sent at 3/6/2019  8:08 AM CST -----  Hemoglobin A1c is 6.1. This is excellent. No changes recommended now. We will recheck it again in 3 months.

## 2019-03-06 NOTE — TELEPHONE ENCOUNTER
Patient informed of below. Expressed understanding. Patient requesting PSA/Vitamin D Level added to Lab Orders for May.   Kamla Miles, 03/06/19, 1:31 PM

## 2019-03-13 NOTE — TELEPHONE ENCOUNTER
Patient is in [place] right now, would like to see a Doctor over there, wants Dr Jarrett Prater to fax all her medical records to [phone] offices phone number is [phone]. Patient has appointment with them on Monday would like  to fax today. Pt informed and verbalized understanding.

## 2019-03-22 NOTE — TELEPHONE ENCOUNTER
Future appt:     Your appointments     Date & Time Appointment Department [facility])    May 17, 2019  8:00 AM CDT Laboratory Visit with REF Beatris Maxwell [facility] ([facility] [place])    May 20, 2019  8:00 AM CDT MA Supervisit with Bob Francisco,

## 2019-03-23 ENCOUNTER — TELEPHONE (OUTPATIENT)
Dept: FAMILY MEDICINE CLINIC | Facility: CLINIC | Age: 69
End: 2019-03-23

## 2019-03-23 RX ORDER — LISINOPRIL 20 MG/1
20 TABLET ORAL 2 TIMES DAILY
Qty: 180 TABLET | Refills: 1 | Status: SHIPPED | OUTPATIENT
Start: 2019-03-23 | End: 2019-05-20

## 2019-03-23 NOTE — TELEPHONE ENCOUNTER
Future Appointments   Date Time Provider Kamran Mcdonnell   5/17/2019  8:00 AM REF [place] [facility]   5/20/2019  8:00 AM Evaristo Pichardo MD [facility] [place] [facility] [place]     Pt is leaving for [place] for 6 weeks.      Pt needs a total of 6 pens

## 2019-03-23 NOTE — TELEPHONE ENCOUNTER
The pharmacy only got 1/2 of his scripts--please call back this morning--they are leaving for [place] Monday.

## 2019-05-17 ENCOUNTER — LABORATORY ENCOUNTER (OUTPATIENT)
Dept: LAB | Age: 69
End: 2019-05-17
Attending: FAMILY MEDICINE
Payer: MEDICARE

## 2019-05-17 DIAGNOSIS — E11.9 DIABETES MELLITUS WITHOUT COMPLICATION (HCC): ICD-10-CM

## 2019-05-17 DIAGNOSIS — N40.1 BENIGN PROSTATIC HYPERPLASIA WITH NOCTURIA: ICD-10-CM

## 2019-05-17 DIAGNOSIS — E11.9 CONTROLLED TYPE 2 DIABETES MELLITUS WITHOUT COMPLICATION, WITHOUT LONG-TERM CURRENT USE OF INSULIN (HCC): ICD-10-CM

## 2019-05-17 DIAGNOSIS — R35.1 NOCTURIA: ICD-10-CM

## 2019-05-17 DIAGNOSIS — R35.1 BENIGN PROSTATIC HYPERPLASIA WITH NOCTURIA: ICD-10-CM

## 2019-05-17 DIAGNOSIS — I10 ESSENTIAL HYPERTENSION, BENIGN: ICD-10-CM

## 2019-05-17 DIAGNOSIS — I10 ESSENTIAL HYPERTENSION: ICD-10-CM

## 2019-05-17 PROCEDURE — 85025 COMPLETE CBC W/AUTO DIFF WBC: CPT

## 2019-05-17 PROCEDURE — 80053 COMPREHEN METABOLIC PANEL: CPT

## 2019-05-17 PROCEDURE — 82570 ASSAY OF URINE CREATININE: CPT

## 2019-05-17 PROCEDURE — 80061 LIPID PANEL: CPT

## 2019-05-17 PROCEDURE — 84443 ASSAY THYROID STIM HORMONE: CPT

## 2019-05-17 PROCEDURE — 82306 VITAMIN D 25 HYDROXY: CPT

## 2019-05-17 PROCEDURE — 82043 UR ALBUMIN QUANTITATIVE: CPT

## 2019-05-17 PROCEDURE — 84550 ASSAY OF BLOOD/URIC ACID: CPT

## 2019-05-17 PROCEDURE — 36415 COLL VENOUS BLD VENIPUNCTURE: CPT

## 2019-05-20 ENCOUNTER — OFFICE VISIT (OUTPATIENT)
Dept: FAMILY MEDICINE CLINIC | Facility: CLINIC | Age: 69
End: 2019-05-20
Payer: COMMERCIAL

## 2019-05-20 VITALS
TEMPERATURE: 98 F | WEIGHT: 188.81 LBS | DIASTOLIC BLOOD PRESSURE: 68 MMHG | OXYGEN SATURATION: 95 % | BODY MASS INDEX: 27.03 KG/M2 | HEIGHT: 70 IN | HEART RATE: 73 BPM | SYSTOLIC BLOOD PRESSURE: 138 MMHG

## 2019-05-20 DIAGNOSIS — M54.50 CHRONIC MIDLINE LOW BACK PAIN WITHOUT SCIATICA: ICD-10-CM

## 2019-05-20 DIAGNOSIS — M72.2 PLANTAR FASCIITIS OF RIGHT FOOT: ICD-10-CM

## 2019-05-20 DIAGNOSIS — G89.29 CHRONIC MIDLINE LOW BACK PAIN WITHOUT SCIATICA: ICD-10-CM

## 2019-05-20 DIAGNOSIS — M17.0 PRIMARY OSTEOARTHRITIS OF BOTH KNEES: ICD-10-CM

## 2019-05-20 DIAGNOSIS — F17.201 TOBACCO DEPENDENCE IN REMISSION: ICD-10-CM

## 2019-05-20 DIAGNOSIS — E11.51 TYPE 2 DIABETES MELLITUS WITH DIABETIC PERIPHERAL ANGIOPATHY WITHOUT GANGRENE, WITH LONG-TERM CURRENT USE OF INSULIN (HCC): ICD-10-CM

## 2019-05-20 DIAGNOSIS — J30.9 ALLERGIC RHINITIS, UNSPECIFIED SEASONALITY, UNSPECIFIED TRIGGER: ICD-10-CM

## 2019-05-20 DIAGNOSIS — Z00.00 ENCOUNTER FOR ANNUAL HEALTH EXAMINATION: Primary | ICD-10-CM

## 2019-05-20 DIAGNOSIS — R35.1 BENIGN PROSTATIC HYPERPLASIA WITH NOCTURIA: ICD-10-CM

## 2019-05-20 DIAGNOSIS — Z79.4 TYPE 2 DIABETES MELLITUS WITH DIABETIC PERIPHERAL ANGIOPATHY WITHOUT GANGRENE, WITH LONG-TERM CURRENT USE OF INSULIN (HCC): ICD-10-CM

## 2019-05-20 DIAGNOSIS — I10 ESSENTIAL HYPERTENSION: ICD-10-CM

## 2019-05-20 DIAGNOSIS — M99.79 NARROWING OF INTERVERTEBRAL DISC SPACE: ICD-10-CM

## 2019-05-20 DIAGNOSIS — N40.1 BENIGN PROSTATIC HYPERPLASIA WITH NOCTURIA: ICD-10-CM

## 2019-05-20 DIAGNOSIS — I73.9 PERIPHERAL VASCULAR DISEASE (HCC): ICD-10-CM

## 2019-05-20 PROCEDURE — G0439 PPPS, SUBSEQ VISIT: HCPCS | Performed by: FAMILY MEDICINE

## 2019-05-20 PROCEDURE — 96160 PT-FOCUSED HLTH RISK ASSMT: CPT | Performed by: FAMILY MEDICINE

## 2019-05-20 PROCEDURE — 99397 PER PM REEVAL EST PAT 65+ YR: CPT | Performed by: FAMILY MEDICINE

## 2019-05-20 RX ORDER — LISINOPRIL 40 MG/1
40 TABLET ORAL
COMMUNITY
Start: 2019-05-06 | End: 2020-04-13

## 2019-05-20 RX ORDER — LORATADINE 10 MG/1
TABLET ORAL
COMMUNITY

## 2019-05-20 NOTE — PATIENT INSTRUCTIONS
Recommended Websites for Advanced Directives    [employer]no. org/publications/Documents/personal_dec. pdf  An information packet, including necessary form from the [address] website. http://www. [place].state. [place]/public/books/adv

## 2019-05-20 NOTE — PROGRESS NOTES
[facility] [place]  PROGRESS NOTE  Chief Complaint:   Patient presents with:  Physical      HPI:   This is a 77year old male coming in for his annual wellness visit. He said that his blood sugars have been doing well.   He has occasional low Ref Range    TSH 4.890 (H) 0.358 - 3.740 mIU/mL   URIC ACID, SERUM   Result Value Ref Range    Uric Acid 4.7 3.5 - 7.2 mg/dL   PSA SCREEN   Result Value Ref Range    Prostate Specific Antigen Screen 1.12 <=4.00 ng/mL   VITAMIN D, 25-HYDROXY   Result Value Mother          at 79 possible heart disease   • No Known Problems Brother         Healthy   • No Known Problems Sister      Allergies:    No Known Allergies        Current Meds:    Current Outpatient Medications:  loratadine 10 MG Oral Tab Take by leonid Denies hearing loss, sneezing, congestion, runny nose or sore throat. INTEGUMENTARY:  Denies rashes, itching, skin lesion, or excessive skin dryness.   CARDIOVASCULAR:  Denies chest pain, chest pressure, chest discomfort, palpitations, edema, dyspnea on e No rashes, no skin lesion, no bruising, good turgor. HEART:  Regular rate and rhythm, no murmurs, rubs or gallops. LUNGS: Clear to auscultation bilterally, no rales/rhonchi/wheezing.   ABDOMEN:  Soft, nondistended, nontender, bowel sounds normal in all 4 trigger  Is mild allergic rhinitis that is often associated with horses. 7. Benign prostatic hyperplasia with nocturia  He has benign prostatic hypertrophy. His prostate is relatively large but he is not having a lot of symptoms now.     8. Primary oste midline low back pain without sciatica      uRsty Lang MD  5/20/2019  8:49 AM

## 2019-05-22 ENCOUNTER — TELEPHONE (OUTPATIENT)
Dept: FAMILY MEDICINE CLINIC | Facility: CLINIC | Age: 69
End: 2019-05-22

## 2019-06-10 ENCOUNTER — APPOINTMENT (OUTPATIENT)
Dept: LAB | Age: 69
End: 2019-06-10
Attending: FAMILY MEDICINE
Payer: MEDICARE

## 2019-06-10 ENCOUNTER — TELEPHONE (OUTPATIENT)
Dept: FAMILY MEDICINE CLINIC | Facility: CLINIC | Age: 69
End: 2019-06-10

## 2019-06-10 DIAGNOSIS — E11.51 TYPE 2 DIABETES MELLITUS WITH DIABETIC PERIPHERAL ANGIOPATHY WITHOUT GANGRENE, WITH LONG-TERM CURRENT USE OF INSULIN (HCC): Primary | ICD-10-CM

## 2019-06-10 DIAGNOSIS — E11.9 CONTROLLED TYPE 2 DIABETES MELLITUS WITHOUT COMPLICATION, WITHOUT LONG-TERM CURRENT USE OF INSULIN (HCC): ICD-10-CM

## 2019-06-10 DIAGNOSIS — Z79.4 TYPE 2 DIABETES MELLITUS WITH DIABETIC PERIPHERAL ANGIOPATHY WITHOUT GANGRENE, WITH LONG-TERM CURRENT USE OF INSULIN (HCC): Primary | ICD-10-CM

## 2019-06-10 PROCEDURE — 36415 COLL VENOUS BLD VENIPUNCTURE: CPT

## 2019-06-10 PROCEDURE — 83036 HEMOGLOBIN GLYCOSYLATED A1C: CPT

## 2019-06-11 ENCOUNTER — TELEPHONE (OUTPATIENT)
Dept: FAMILY MEDICINE CLINIC | Facility: CLINIC | Age: 69
End: 2019-06-11

## 2019-06-11 ENCOUNTER — MED REC SCAN ONLY (OUTPATIENT)
Dept: FAMILY MEDICINE CLINIC | Facility: CLINIC | Age: 69
End: 2019-06-11

## 2019-06-11 RX ORDER — INSULIN DETEMIR 100 [IU]/ML
INJECTION, SOLUTION SUBCUTANEOUS
Qty: 18 ML | Refills: 1 | Status: SHIPPED | OUTPATIENT
Start: 2019-06-11 | End: 2019-08-26 | Stop reason: ALTCHOICE

## 2019-06-11 NOTE — TELEPHONE ENCOUNTER
Future appt: Your appointments     Date & Time Appointment Department [facility])    Nov 04, 2019  8:00 AM CST Laboratory Visit with REF Starlette Nine [facility] ([facility] Ref [facility] [place])            [address] [facility]  ED[facility] [place]  [address]

## 2019-06-11 NOTE — TELEPHONE ENCOUNTER
----- Message from Da Silva Precise sent at 6/11/2019  2:28 PM CDT -----  Returned call [phone]

## 2019-06-11 NOTE — TELEPHONE ENCOUNTER
----- Message from Lizet Moya MD sent at 6/11/2019  9:51 AM CDT -----  Hemoglobin A1c is good at 6.3.

## 2019-06-12 NOTE — TELEPHONE ENCOUNTER
Future appt: Your appointments     Date & Time Appointment Department [facility])    Nov 04, 2019  8:00 AM CST Laboratory Visit with REF [facility] ([facility] [place])            THE [facility]  EDW Ref [facility] [place]  [address]

## 2019-06-13 ENCOUNTER — TELEPHONE (OUTPATIENT)
Dept: FAMILY MEDICINE CLINIC | Facility: CLINIC | Age: 69
End: 2019-06-13

## 2019-06-13 NOTE — TELEPHONE ENCOUNTER
Fax received from [employer] stating patient requests a 90 day supply of his Lispro. Asking for 42 ml (14 pens). Original script given yesterday, 6/12/2019 #3 with 1 RF. Please advise.

## 2019-06-14 ENCOUNTER — TELEPHONE (OUTPATIENT)
Dept: FAMILY MEDICINE CLINIC | Facility: CLINIC | Age: 69
End: 2019-06-14

## 2019-06-14 NOTE — TELEPHONE ENCOUNTER
M/L for patient to return call. Need to confirm Humalog directions with patient.   Palak Hernandez, 06/14/19, 10:09 AM

## 2019-06-14 NOTE — TELEPHONE ENCOUNTER
Received prescription from , would like to know how many times patient is injecting. Would like a call back.

## 2019-06-17 NOTE — TELEPHONE ENCOUNTER
Directions updated per Patient. He has already picked up his Insulin from Pharmacy.   Esme Hamilton, 06/17/19, 3:19 PM

## 2019-06-17 NOTE — TELEPHONE ENCOUNTER
----- Message from Tess Elmore sent at 6/17/2019  1:40 PM CDT -----  Contact: [phone]    [facility]

## 2019-08-05 ENCOUNTER — TELEPHONE (OUTPATIENT)
Dept: FAMILY MEDICINE CLINIC | Facility: CLINIC | Age: 69
End: 2019-08-05

## 2019-08-05 DIAGNOSIS — E11.51 TYPE 2 DIABETES MELLITUS WITH DIABETIC PERIPHERAL ANGIOPATHY WITHOUT GANGRENE, WITH LONG-TERM CURRENT USE OF INSULIN (HCC): Primary | ICD-10-CM

## 2019-08-05 DIAGNOSIS — Z79.4 TYPE 2 DIABETES MELLITUS WITH DIABETIC PERIPHERAL ANGIOPATHY WITHOUT GANGRENE, WITH LONG-TERM CURRENT USE OF INSULIN (HCC): Primary | ICD-10-CM

## 2019-08-16 ENCOUNTER — APPOINTMENT (OUTPATIENT)
Dept: LAB | Age: 69
End: 2019-08-16
Attending: FAMILY MEDICINE
Payer: MEDICARE

## 2019-08-16 PROCEDURE — 36415 COLL VENOUS BLD VENIPUNCTURE: CPT | Performed by: FAMILY MEDICINE

## 2019-08-16 PROCEDURE — 83036 HEMOGLOBIN GLYCOSYLATED A1C: CPT | Performed by: FAMILY MEDICINE

## 2019-08-17 ENCOUNTER — TELEPHONE (OUTPATIENT)
Dept: FAMILY MEDICINE CLINIC | Facility: CLINIC | Age: 69
End: 2019-08-17

## 2019-08-17 NOTE — TELEPHONE ENCOUNTER
----- Message from Camilo Gutierrez MD sent at 8/16/2019  6:01 PM CDT -----  Hemoglobin A1c is 6.1. Continue the same plan.

## 2019-08-26 NOTE — TELEPHONE ENCOUNTER
See Scanned Image from Diabetes Ed. Patient apparently did change from   [place] to [place]. Please advise Rx to Shazia Quintero, 08/26/19, 4:02 PM    Future appt:     Your appointments     Date & Time Appointment Department [facility])    Nov 04, 2019

## 2019-10-29 ENCOUNTER — TELEPHONE (OUTPATIENT)
Dept: FAMILY MEDICINE CLINIC | Facility: CLINIC | Age: 69
End: 2019-10-29

## 2019-10-29 DIAGNOSIS — E11.51 TYPE 2 DIABETES MELLITUS WITH DIABETIC PERIPHERAL ANGIOPATHY WITHOUT GANGRENE, WITH LONG-TERM CURRENT USE OF INSULIN (HCC): Primary | ICD-10-CM

## 2019-10-29 DIAGNOSIS — Z79.4 TYPE 2 DIABETES MELLITUS WITH DIABETIC PERIPHERAL ANGIOPATHY WITHOUT GANGRENE, WITH LONG-TERM CURRENT USE OF INSULIN (HCC): Primary | ICD-10-CM

## 2019-10-29 NOTE — TELEPHONE ENCOUNTER
Please advise A1c Order for [facility].    Future appt:    Last Appointment:  5/20/2019  Cholesterol, Total (mg/dL)   Date Value   05/17/2019 141     HDL Cholesterol (mg/dL)   Date Value   05/17/2019 56     LDL Cholesterol (mg/dL)   Date Value   05/17/2019 67

## 2019-10-29 NOTE — TELEPHONE ENCOUNTER
Patient informed of below. Appt given for Diabetes Follow Up. Delilah Alegre, 10/29/19, 1:00 PM    Future appt:     Your appointments     Date & Time Appointment Department [facility])    Nov 20, 2019  9:00 AM CST Exam - Established with Shahla Zheng MD E

## 2019-11-05 ENCOUNTER — TELEPHONE (OUTPATIENT)
Dept: FAMILY MEDICINE CLINIC | Facility: CLINIC | Age: 69
End: 2019-11-05

## 2019-11-05 NOTE — TELEPHONE ENCOUNTER
Patient wanting to confirm that upcoming visit is not for another Medicare Px. Informed no, this will be for Diabetes Follow Up/agreed.

## 2019-11-06 ENCOUNTER — TELEPHONE (OUTPATIENT)
Dept: FAMILY MEDICINE CLINIC | Facility: CLINIC | Age: 69
End: 2019-11-06

## 2019-11-11 RX ORDER — FLUTICASONE PROPIONATE 50 MCG
SPRAY, SUSPENSION (ML) NASAL
Qty: 3 INHALER | Refills: 1 | Status: SHIPPED | OUTPATIENT
Start: 2019-11-11 | End: 2021-03-12

## 2019-11-11 RX ORDER — PEN NEEDLE, DIABETIC 32GX 5/32"
NEEDLE, DISPOSABLE MISCELLANEOUS
Qty: 600 EACH | Refills: 1 | Status: SHIPPED | OUTPATIENT
Start: 2019-11-11 | End: 2020-03-16

## 2019-11-11 NOTE — TELEPHONE ENCOUNTER
Future appt:     Your appointments     Date & Time Appointment Department [facility])    Nov 20, 2019  9:00 AM CST Follow up - Extended with Joie Mitchell MD [address] [place] [address], [place] ([facility])            [place]

## 2019-11-20 ENCOUNTER — OFFICE VISIT (OUTPATIENT)
Dept: FAMILY MEDICINE CLINIC | Facility: CLINIC | Age: 69
End: 2019-11-20
Payer: COMMERCIAL

## 2019-11-20 VITALS
RESPIRATION RATE: 16 BRPM | WEIGHT: 183.63 LBS | DIASTOLIC BLOOD PRESSURE: 60 MMHG | OXYGEN SATURATION: 94 % | BODY MASS INDEX: 26.29 KG/M2 | SYSTOLIC BLOOD PRESSURE: 120 MMHG | HEIGHT: 70 IN | TEMPERATURE: 97 F | HEART RATE: 87 BPM

## 2019-11-20 DIAGNOSIS — E11.51 TYPE 2 DIABETES MELLITUS WITH DIABETIC PERIPHERAL ANGIOPATHY WITHOUT GANGRENE, WITH LONG-TERM CURRENT USE OF INSULIN (HCC): Primary | ICD-10-CM

## 2019-11-20 DIAGNOSIS — I73.9 PERIPHERAL VASCULAR DISEASE (HCC): ICD-10-CM

## 2019-11-20 DIAGNOSIS — M17.0 PRIMARY OSTEOARTHRITIS OF BOTH KNEES: ICD-10-CM

## 2019-11-20 DIAGNOSIS — F17.200 TOBACCO DEPENDENCE: ICD-10-CM

## 2019-11-20 DIAGNOSIS — I10 ESSENTIAL HYPERTENSION, BENIGN: ICD-10-CM

## 2019-11-20 DIAGNOSIS — Z79.4 TYPE 2 DIABETES MELLITUS WITH DIABETIC PERIPHERAL ANGIOPATHY WITHOUT GANGRENE, WITH LONG-TERM CURRENT USE OF INSULIN (HCC): Primary | ICD-10-CM

## 2019-11-20 PROCEDURE — 99214 OFFICE O/P EST MOD 30 MIN: CPT | Performed by: FAMILY MEDICINE

## 2019-11-20 NOTE — PROGRESS NOTES
[place] MEDICAL [facility] [place]  PROGRESS NOTE  Chief Complaint:   Patient presents with:  Diabetes  Follow - Up      HPI:   This is a 77year old male coming in for a diabetes follow-up.   He said that his blood pressure has been going up and down at rehab b • Smoking history      Past Surgical History:   Procedure Laterality Date   • Paulo Colon  [id number]    Complicated by postop osteomyelitis     Social History:  Social History    Tobacco Use      Smoking status: Former Smoker        Types: Cigarettes Insulin Aspart, w/Niacinamide, (FIASP FLEXTOUCH) 100 UNIT/ML Subcutaneous Solution Pen-injector Sliding scale up to 45 units daily; use tid ac. 5 pen 5      Counseling given: Not Answered       REVIEW OF SYSTEMS:   CONSTITUTIONAL: He feels like he is doing bilaterally, no eye discharge Ears: External normal. Nose: patent, no nasal discharge Throat:  No tonsillar erythema or exudate. Mouth:  No oral lesions or ulcerations, good dentition. NECK: Supple, no CLAD, no JVD, no thyromegaly.   SKIN: No rashes, no s No prescriptions requested or ordered in this encounter       Health Maintenance: There are no preventive care reminders to display for this patient. Patient/Caregiver Education: Patient/Caregiver Education: There are no barriers to learning.  Medical e

## 2019-11-25 NOTE — TELEPHONE ENCOUNTER
Future appt:     Your appointments     Date & Time Appointment Department [facility])    May 13, 2020  8:00 AM CDT MA Supervisit with Sonya Mae MD [address] [place] [address] Jeremy [place] ([facility]            Cristino Cohngo

## 2019-11-27 ENCOUNTER — OFFICE VISIT (OUTPATIENT)
Dept: FAMILY MEDICINE CLINIC | Facility: CLINIC | Age: 69
End: 2019-11-27
Payer: COMMERCIAL

## 2019-11-27 VITALS
TEMPERATURE: 98 F | HEART RATE: 77 BPM | WEIGHT: 182 LBS | OXYGEN SATURATION: 98 % | SYSTOLIC BLOOD PRESSURE: 134 MMHG | BODY MASS INDEX: 26.05 KG/M2 | HEIGHT: 70 IN | DIASTOLIC BLOOD PRESSURE: 66 MMHG

## 2019-11-27 DIAGNOSIS — R05.9 COUGH: ICD-10-CM

## 2019-11-27 DIAGNOSIS — R09.81 NASAL CONGESTION: Primary | ICD-10-CM

## 2019-11-27 PROCEDURE — 99213 OFFICE O/P EST LOW 20 MIN: CPT | Performed by: NURSE PRACTITIONER

## 2019-11-27 RX ORDER — AMOXICILLIN AND CLAVULANATE POTASSIUM 875; 125 MG/1; MG/1
1 TABLET, FILM COATED ORAL 2 TIMES DAILY
Qty: 20 TABLET | Refills: 0 | Status: SHIPPED | OUTPATIENT
Start: 2019-11-27 | End: 2019-12-07

## 2019-11-27 NOTE — PATIENT INSTRUCTIONS
Wait and see  approach     Viral in etiology, if symmptoms worsen or if cherry debeolop facial pain, ear pain, begin taking antibiotoc     Augmentin sent to pharmacy   Take acetaminophen or ibuprofen for fever/discomfort  Drink plenty of fluids, warm liquids including inside the mouth  · seizures  · trouble passing urine or change in the amount of urine  · unusual bleeding, bruising  · unusually weak or tired  · white patches or sores in the mouth or throat  Side effects that usually do not require medical att method of birth control. NOTE:This sheet is a summary. It may not cover all possible information. If you have questions about this medicine, talk to your doctor, pharmacist, or health care provider.  Copyright© 2019 Elsevier

## 2019-11-27 NOTE — PROGRESS NOTES
HPI:    Patient ID: Khoa Rees is a 77year old male. Patient presents to the clinic today with complaints of cough, sneezing, congestion, and body aches since Sunday.  No facial pain, no ear pain, no chills, no fever , no sob, nowheezing, and no ch loratadine 10 MG Oral Tab Take by mouth. • lisinopril 40 MG Oral Tab Take 40 mg by mouth. • atorvastatin 10 MG Oral Tab Take 1 tablet by mouth daily. • aspirin 81 MG Oral Tab Take 81 mg by mouth daily.      • ONETOUCH LANCETS Does not apply Misc Referrals:  None       #[id number]

## 2019-12-04 RX ORDER — INSULIN DEGLUDEC 200 U/ML
INJECTION, SOLUTION SUBCUTANEOUS
Qty: 15 ML | Refills: 5 | Status: SHIPPED | OUTPATIENT
Start: 2019-12-04 | End: 2020-03-16

## 2019-12-04 NOTE — TELEPHONE ENCOUNTER
Future Appointments   Date Time Provider Kamran Mcdonnell   5/13/2020  8:00 AM Heaven Cummins MD [facility] [place] [facility]

## 2019-12-29 ENCOUNTER — PATIENT MESSAGE (OUTPATIENT)
Dept: FAMILY MEDICINE CLINIC | Facility: CLINIC | Age: 69
End: 2019-12-29

## 2019-12-30 NOTE — TELEPHONE ENCOUNTER
From: Phillip Joseph  To: Ulisses Bernstein MD  Sent: 12/29/2019 10:58 AM CST  Subject: Other    i saw the letter about the colon screen. I had a colonoscopy in 2016 with Dr. Charles Cruz at [facility] and at that time I was cleared for 10 years.  I will reschedule w

## 2020-02-24 ENCOUNTER — TELEPHONE (OUTPATIENT)
Dept: FAMILY MEDICINE CLINIC | Facility: CLINIC | Age: 70
End: 2020-02-24

## 2020-02-24 DIAGNOSIS — I73.9 PERIPHERAL VASCULAR DISEASE (HCC): ICD-10-CM

## 2020-02-24 DIAGNOSIS — R35.1 BENIGN PROSTATIC HYPERPLASIA WITH NOCTURIA: ICD-10-CM

## 2020-02-24 DIAGNOSIS — I10 ESSENTIAL HYPERTENSION, BENIGN: ICD-10-CM

## 2020-02-24 DIAGNOSIS — E11.51 TYPE 2 DIABETES MELLITUS WITH DIABETIC PERIPHERAL ANGIOPATHY WITHOUT GANGRENE, WITH LONG-TERM CURRENT USE OF INSULIN (HCC): Primary | ICD-10-CM

## 2020-02-24 DIAGNOSIS — Z79.4 TYPE 2 DIABETES MELLITUS WITH DIABETIC PERIPHERAL ANGIOPATHY WITHOUT GANGRENE, WITH LONG-TERM CURRENT USE OF INSULIN (HCC): Primary | ICD-10-CM

## 2020-02-24 DIAGNOSIS — N40.1 BENIGN PROSTATIC HYPERPLASIA WITH NOCTURIA: ICD-10-CM

## 2020-02-24 LAB — HEMOGLOBIN A1C: 5.8

## 2020-02-24 NOTE — TELEPHONE ENCOUNTER
Patient informed of below. Expressed understanding.     Lab orders for May faxed to Alek Gastelum, 02/24/20, 3:31 PM

## 2020-02-24 NOTE — TELEPHONE ENCOUNTER
Please call Debi Chavez. His A1c is 5.8. This is an improvement over his A1c 3 months ago. Please keep up the good work with the insulin and regular exercise. Plan on returning for a Medicare wellness visit in May. Orders have been entered for that visit.

## 2020-03-16 ENCOUNTER — TELEPHONE (OUTPATIENT)
Dept: FAMILY MEDICINE CLINIC | Facility: CLINIC | Age: 70
End: 2020-03-16

## 2020-03-16 DIAGNOSIS — Z79.4 TYPE 2 DIABETES MELLITUS WITH DIABETIC PERIPHERAL ANGIOPATHY WITHOUT GANGRENE, WITH LONG-TERM CURRENT USE OF INSULIN (HCC): Primary | ICD-10-CM

## 2020-03-16 DIAGNOSIS — E11.51 TYPE 2 DIABETES MELLITUS WITH DIABETIC PERIPHERAL ANGIOPATHY WITHOUT GANGRENE, WITH LONG-TERM CURRENT USE OF INSULIN (HCC): Primary | ICD-10-CM

## 2020-03-16 NOTE — TELEPHONE ENCOUNTER
Requesting refill for Tresiba sent to [address].  Please call back to confirm refill was sent

## 2020-04-13 DIAGNOSIS — Z79.4 TYPE 2 DIABETES MELLITUS WITH DIABETIC PERIPHERAL ANGIOPATHY WITHOUT GANGRENE, WITH LONG-TERM CURRENT USE OF INSULIN (HCC): ICD-10-CM

## 2020-04-13 DIAGNOSIS — E11.51 TYPE 2 DIABETES MELLITUS WITH DIABETIC PERIPHERAL ANGIOPATHY WITHOUT GANGRENE, WITH LONG-TERM CURRENT USE OF INSULIN (HCC): ICD-10-CM

## 2020-04-13 DIAGNOSIS — I10 ESSENTIAL HYPERTENSION, BENIGN: Primary | ICD-10-CM

## 2020-04-13 RX ORDER — INSULIN LISPRO 100 [IU]/ML
45 INJECTION, SOLUTION INTRAVENOUS; SUBCUTANEOUS DAILY
Qty: 15 PEN | Refills: 0 | Status: SHIPPED | OUTPATIENT
Start: 2020-04-13 | End: 2020-05-06

## 2020-04-13 RX ORDER — LISINOPRIL 40 MG/1
40 TABLET ORAL DAILY
Qty: 90 TABLET | Refills: 0 | Status: SHIPPED | OUTPATIENT
Start: 2020-04-13 | End: 2021-03-11

## 2020-04-13 RX ORDER — INSULIN LISPRO 100 [IU]/ML
45 INJECTION, SOLUTION INTRAVENOUS; SUBCUTANEOUS DAILY
COMMUNITY
End: 2020-04-13

## 2020-04-13 NOTE — TELEPHONE ENCOUNTER
Patient is needing medication refill     Medication:     Humalog 100 unit     3D Pen Needles (32 gauge)     One touch Ultra Test Strips     Linsinopril 40 mg         [facility] pharmacy in [place], [place].

## 2020-04-13 NOTE — TELEPHONE ENCOUNTER
Future appt:     Your appointments     Date & Time Appointment Department [facility])    May 13, 2020  8:00 AM CDT MA Supervisit with Tommy Mohr MD [address] [place] [address], Stevphen Canavan ([facility]

## 2020-05-06 DIAGNOSIS — E11.51 TYPE 2 DIABETES MELLITUS WITH DIABETIC PERIPHERAL ANGIOPATHY WITHOUT GANGRENE, WITH LONG-TERM CURRENT USE OF INSULIN (HCC): ICD-10-CM

## 2020-05-06 DIAGNOSIS — Z79.4 TYPE 2 DIABETES MELLITUS WITH DIABETIC PERIPHERAL ANGIOPATHY WITHOUT GANGRENE, WITH LONG-TERM CURRENT USE OF INSULIN (HCC): ICD-10-CM

## 2020-05-06 RX ORDER — INSULIN LISPRO 100 [IU]/ML
INJECTION, SOLUTION INTRAVENOUS; SUBCUTANEOUS
Qty: 15 PEN | Refills: 0 | Status: SHIPPED | OUTPATIENT
Start: 2020-05-06 | End: 2020-07-14

## 2020-05-06 NOTE — TELEPHONE ENCOUNTER
Future appt:     Your appointments     Date & Time Appointment Department [facility])    Jun 03, 2020 10:30 AM CDT MA Supervisit with Shahla Zheng MD [address], [facility] ([address] [place] [address])            [facility]

## 2020-05-12 ENCOUNTER — TELEPHONE (OUTPATIENT)
Dept: FAMILY MEDICINE CLINIC | Facility: CLINIC | Age: 70
End: 2020-05-12

## 2020-05-12 NOTE — TELEPHONE ENCOUNTER
It looks like he already has orders that were ordered 2/24/20 to [facility]. The only lab that was completed was the A1c. Please advise.

## 2020-05-12 NOTE — TELEPHONE ENCOUNTER
The labs that were ordered on February 24, 2020 on the ones that should be completed now. Please use those orders.

## 2020-05-27 DIAGNOSIS — E11.51 TYPE 2 DIABETES MELLITUS WITH DIABETIC PERIPHERAL ANGIOPATHY WITHOUT GANGRENE, WITH LONG-TERM CURRENT USE OF INSULIN (HCC): ICD-10-CM

## 2020-05-27 DIAGNOSIS — Z79.4 TYPE 2 DIABETES MELLITUS WITH DIABETIC PERIPHERAL ANGIOPATHY WITHOUT GANGRENE, WITH LONG-TERM CURRENT USE OF INSULIN (HCC): ICD-10-CM

## 2020-05-27 NOTE — TELEPHONE ENCOUNTER
Howe requesting Rx for 3 pens(full box)  Cannot give partial box any longer due to   [facility].   Arlene Power, 05/27/20, 2:39 PM

## 2020-06-03 ENCOUNTER — OFFICE VISIT (OUTPATIENT)
Dept: FAMILY MEDICINE CLINIC | Facility: CLINIC | Age: 70
End: 2020-06-03
Payer: COMMERCIAL

## 2020-06-03 VITALS
OXYGEN SATURATION: 95 % | SYSTOLIC BLOOD PRESSURE: 122 MMHG | HEIGHT: 70 IN | WEIGHT: 177 LBS | DIASTOLIC BLOOD PRESSURE: 62 MMHG | HEART RATE: 85 BPM | BODY MASS INDEX: 25.34 KG/M2 | RESPIRATION RATE: 18 BRPM | TEMPERATURE: 98 F

## 2020-06-03 DIAGNOSIS — I73.9 PERIPHERAL VASCULAR DISEASE (HCC): ICD-10-CM

## 2020-06-03 DIAGNOSIS — E11.51 TYPE 2 DIABETES MELLITUS WITH DIABETIC PERIPHERAL ANGIOPATHY WITHOUT GANGRENE, WITH LONG-TERM CURRENT USE OF INSULIN (HCC): ICD-10-CM

## 2020-06-03 DIAGNOSIS — G89.29 CHRONIC MIDLINE LOW BACK PAIN WITHOUT SCIATICA: ICD-10-CM

## 2020-06-03 DIAGNOSIS — R35.1 BENIGN PROSTATIC HYPERPLASIA WITH NOCTURIA: ICD-10-CM

## 2020-06-03 DIAGNOSIS — F17.200 TOBACCO DEPENDENCE: ICD-10-CM

## 2020-06-03 DIAGNOSIS — J30.9 ALLERGIC RHINITIS, UNSPECIFIED SEASONALITY, UNSPECIFIED TRIGGER: ICD-10-CM

## 2020-06-03 DIAGNOSIS — M17.0 PRIMARY OSTEOARTHRITIS OF BOTH KNEES: ICD-10-CM

## 2020-06-03 DIAGNOSIS — M99.79 NARROWING OF INTERVERTEBRAL DISC SPACE: ICD-10-CM

## 2020-06-03 DIAGNOSIS — N40.1 BENIGN PROSTATIC HYPERPLASIA WITH NOCTURIA: ICD-10-CM

## 2020-06-03 DIAGNOSIS — M54.50 CHRONIC MIDLINE LOW BACK PAIN WITHOUT SCIATICA: ICD-10-CM

## 2020-06-03 DIAGNOSIS — Z79.4 TYPE 2 DIABETES MELLITUS WITH DIABETIC PERIPHERAL ANGIOPATHY WITHOUT GANGRENE, WITH LONG-TERM CURRENT USE OF INSULIN (HCC): ICD-10-CM

## 2020-06-03 DIAGNOSIS — I10 ESSENTIAL HYPERTENSION, BENIGN: ICD-10-CM

## 2020-06-03 DIAGNOSIS — Z00.00 ENCOUNTER FOR ANNUAL HEALTH EXAMINATION: Primary | ICD-10-CM

## 2020-06-03 PROBLEM — M72.2 PLANTAR FASCIITIS OF RIGHT FOOT: Status: RESOLVED | Noted: 2018-11-15 | Resolved: 2020-06-03

## 2020-06-03 PROCEDURE — 96160 PT-FOCUSED HLTH RISK ASSMT: CPT | Performed by: FAMILY MEDICINE

## 2020-06-03 PROCEDURE — G0439 PPPS, SUBSEQ VISIT: HCPCS | Performed by: FAMILY MEDICINE

## 2020-06-03 PROCEDURE — 99397 PER PM REEVAL EST PAT 65+ YR: CPT | Performed by: FAMILY MEDICINE

## 2020-06-03 NOTE — PROGRESS NOTES
HPI:   Ubaldo Manzo is a 72year old male who presents for a Medicare Annual Wellness Visit.       His last annual assessment has been over 1 year: Annual Physical due on 05/20/2020       He is doing a good job of managing his blood sugars with his insuli knees     Chronic midline low back pain without sciatica    Wt Readings from Last 3 Encounters:  06/03/20 : 177 lb (80.3 kg)  11/27/19 : 182 lb (82.6 kg)  11/20/19 : 183 lb 9.6 oz (83.3 kg)     Last Cholesterol Labs:   Lab Results   Component Value Date history that includes laminectomy,lumbar (2005). His family history includes CAD in his father and mother; No Known Problems in his brother and sister. SOCIAL HISTORY:   He  reports that he has quit smoking. His smoking use included cigarettes.  He has rhythm. Pulses: Normal pulses. Heart sounds: Normal heart sounds. Pulmonary:      Effort: Pulmonary effort is normal.      Breath sounds: Normal breath sounds. Abdominal:      General: Abdomen is flat.  Bowel sounds are normal.   Musculoskelet patient indicates understanding of these issues and agrees to the plan. Labs reviewed. Continue the same medications. Please work on quitting smoking. Continue to exercise regularly. Check hemoglobin A1c in 3 months.   Reschedule office visit in 6 mo on 05/27/2022  Update Health Maintenance if applicable     Immunizations (Update Immunization Activity if applicable)     Influenza  Covered Annually 10/30/2019   Please get every year    Pneumococcal 13 (Prevnar)  Covered Once after 65 05/13/2016 Please g data found. Dilated Eye exam  Annually Data entered on: 5/22/2019   Last Dilated Eye Exam 5/16/2019     No flowsheet data found.

## 2020-06-03 NOTE — PATIENT INSTRUCTIONS
Keep up the good work. Please work on quitting smoking. Recommended Websites for Advanced Directives    [email or web address] org/publications/Documents/personal_dec. pdf  An information packet, including necessary form from the [employer] w

## 2020-06-16 ENCOUNTER — TELEPHONE (OUTPATIENT)
Dept: FAMILY MEDICINE CLINIC | Facility: CLINIC | Age: 70
End: 2020-06-16

## 2020-06-16 DIAGNOSIS — Z79.4 TYPE 2 DIABETES MELLITUS WITH DIABETIC PERIPHERAL ANGIOPATHY WITHOUT GANGRENE, WITH LONG-TERM CURRENT USE OF INSULIN (HCC): Primary | ICD-10-CM

## 2020-06-16 DIAGNOSIS — E55.9 VITAMIN D DEFICIENCY: ICD-10-CM

## 2020-06-16 DIAGNOSIS — E11.51 TYPE 2 DIABETES MELLITUS WITH DIABETIC PERIPHERAL ANGIOPATHY WITHOUT GANGRENE, WITH LONG-TERM CURRENT USE OF INSULIN (HCC): Primary | ICD-10-CM

## 2020-06-16 NOTE — TELEPHONE ENCOUNTER
wants to add vitamin d to his a1c blood draw to have done at [facility], order needed?  And [facility] for [facility] appointment

## 2020-06-17 NOTE — PROGRESS NOTES
Addendum created to address each of his problems. Addendum created on June 17, 2020.    1. Encounter for annual health examination  This is his annual wellness visit. He is doing very well overall.     2. Type 2 diabetes mellitus with diabetic peripheral

## 2020-06-24 ENCOUNTER — TELEPHONE (OUTPATIENT)
Dept: FAMILY MEDICINE CLINIC | Facility: CLINIC | Age: 70
End: 2020-06-24

## 2020-06-24 NOTE — TELEPHONE ENCOUNTER
Pt submitted a request to have a self copy of his medical records from Jan 2016 to present date. This was sent to [employer].

## 2020-07-06 ENCOUNTER — TELEPHONE (OUTPATIENT)
Dept: FAMILY MEDICINE CLINIC | Facility: CLINIC | Age: 70
End: 2020-07-06

## 2020-07-08 ENCOUNTER — TELEPHONE (OUTPATIENT)
Dept: FAMILY MEDICINE CLINIC | Facility: CLINIC | Age: 70
End: 2020-07-08

## 2020-07-08 NOTE — PROGRESS NOTES
Addendum added July 8, 2020. Lynne Cantrell has type 1 diabetes and is taking Ilah Majestic daily and Humalog 3 times daily with meals. He is making adjustments in his insulin based on fluctuating blood sugar readings.

## 2020-07-08 NOTE — TELEPHONE ENCOUNTER
States that they needs an updated referral, states that the one they received last time was dated 06/10/19 and was rejected.  Would like Dr to fax updated referral to [phone]

## 2020-07-14 DIAGNOSIS — Z79.4 TYPE 2 DIABETES MELLITUS WITH DIABETIC PERIPHERAL ANGIOPATHY WITHOUT GANGRENE, WITH LONG-TERM CURRENT USE OF INSULIN (HCC): ICD-10-CM

## 2020-07-14 DIAGNOSIS — E11.51 TYPE 2 DIABETES MELLITUS WITH DIABETIC PERIPHERAL ANGIOPATHY WITHOUT GANGRENE, WITH LONG-TERM CURRENT USE OF INSULIN (HCC): ICD-10-CM

## 2020-07-14 RX ORDER — INSULIN LISPRO 100 [IU]/ML
INJECTION, SOLUTION INTRAVENOUS; SUBCUTANEOUS
Qty: 15 PEN | Refills: 1 | Status: SHIPPED | OUTPATIENT
Start: 2020-07-14 | End: 2020-12-09

## 2020-07-14 NOTE — TELEPHONE ENCOUNTER
Future appt:    Last Appointment with provider:   6/3/2020  Last appointment at [facility]:  6/3/2020  CHOLESTEROL, TOTAL (mg/dL)   Date Value   05/27/2020 142     HDL CHOLESTEROL (mg/dL)   Date Value   05/27/2020 49     LDL-CHOLESTEROL (mg/dL (calc))

## 2020-08-31 LAB
HEMOGLOBIN A1C: 6 % OF TOTAL HGB
VITAMIN D, 1,25 (OH)2,$TOTAL: 24 PG/ML (ref 18–72)
VITAMIN D2, 1,25 (OH)2: <8 PG/ML
VITAMIN D3, 1,25 (OH)2: 24 PG/ML

## 2020-09-01 ENCOUNTER — TELEPHONE (OUTPATIENT)
Dept: FAMILY MEDICINE CLINIC | Facility: CLINIC | Age: 70
End: 2020-09-01

## 2020-09-01 NOTE — TELEPHONE ENCOUNTER
----- Message from Irene Gong MD sent at 9/1/2020  8:06 AM CDT -----  Vitamin D level remains low at 24. Please take an over-the-counter vitamin D supplement of 2000 units daily. Hemoglobin A1c is normal at 6.0.

## 2020-09-02 NOTE — TELEPHONE ENCOUNTER
Patient informed of below. He has viewed his lab results on ip.accesst.   Delilah Alegre, 09/02/20, 5:04 PM

## 2020-09-11 DIAGNOSIS — E11.51 TYPE 2 DIABETES MELLITUS WITH DIABETIC PERIPHERAL ANGIOPATHY WITHOUT GANGRENE, WITH LONG-TERM CURRENT USE OF INSULIN (HCC): ICD-10-CM

## 2020-09-11 DIAGNOSIS — Z79.4 TYPE 2 DIABETES MELLITUS WITH DIABETIC PERIPHERAL ANGIOPATHY WITHOUT GANGRENE, WITH LONG-TERM CURRENT USE OF INSULIN (HCC): ICD-10-CM

## 2020-09-11 RX ORDER — INSULIN DEGLUDEC 200 U/ML
INJECTION, SOLUTION SUBCUTANEOUS
Qty: 3 PEN | Refills: 1 | Status: SHIPPED | OUTPATIENT
Start: 2020-09-11 | End: 2020-12-09

## 2020-09-11 NOTE — TELEPHONE ENCOUNTER
Future appt:     Your appointments     Date & Time Appointment Department [facility])    Dec 02, 2020  8:00 AM CST Follow Up Visit with Rfay Moran MD [address], [place]ie Stagers ([facility])            [address] [facility]

## 2020-10-22 DIAGNOSIS — E11.51 TYPE 2 DIABETES MELLITUS WITH DIABETIC PERIPHERAL ANGIOPATHY WITHOUT GANGRENE, WITH LONG-TERM CURRENT USE OF INSULIN (HCC): ICD-10-CM

## 2020-10-22 DIAGNOSIS — Z79.4 TYPE 2 DIABETES MELLITUS WITH DIABETIC PERIPHERAL ANGIOPATHY WITHOUT GANGRENE, WITH LONG-TERM CURRENT USE OF INSULIN (HCC): ICD-10-CM

## 2020-10-22 RX ORDER — PEN NEEDLE, DIABETIC 32GX 5/32"
NEEDLE, DISPOSABLE MISCELLANEOUS
Qty: 600 EACH | Refills: 1 | Status: SHIPPED | OUTPATIENT
Start: 2020-10-22 | End: 2021-08-30

## 2020-10-22 NOTE — TELEPHONE ENCOUNTER
Future appt:     Your appointments     Date & Time Appointment Department [facility])    Dec 02, 2020  8:00 AM CST Follow Up Visit with Jackelin Vivas MD [address], Annalee Mathis ([facility])            [address] [place] [address]

## 2020-11-20 ENCOUNTER — TELEPHONE (OUTPATIENT)
Dept: FAMILY MEDICINE CLINIC | Facility: CLINIC | Age: 70
End: 2020-11-20

## 2020-11-20 ENCOUNTER — HOSPITAL ENCOUNTER (OUTPATIENT)
Dept: GENERAL RADIOLOGY | Age: 70
Discharge: HOME | End: 2020-11-20
Attending: NURSE PRACTITIONER
Payer: MEDICARE

## 2020-11-20 ENCOUNTER — OFFICE VISIT (OUTPATIENT)
Dept: FAMILY MEDICINE CLINIC | Facility: CLINIC | Age: 70
End: 2020-11-20
Payer: COMMERCIAL

## 2020-11-20 VITALS
DIASTOLIC BLOOD PRESSURE: 64 MMHG | RESPIRATION RATE: 16 BRPM | HEIGHT: 70 IN | HEART RATE: 88 BPM | SYSTOLIC BLOOD PRESSURE: 124 MMHG | TEMPERATURE: 99 F | OXYGEN SATURATION: 98 % | BODY MASS INDEX: 26.05 KG/M2 | WEIGHT: 182 LBS

## 2020-11-20 DIAGNOSIS — S69.92XA INJURY OF LEFT WRIST, INITIAL ENCOUNTER: ICD-10-CM

## 2020-11-20 DIAGNOSIS — Z79.4 TYPE 2 DIABETES MELLITUS WITH DIABETIC PERIPHERAL ANGIOPATHY WITHOUT GANGRENE, WITH LONG-TERM CURRENT USE OF INSULIN (HCC): Primary | ICD-10-CM

## 2020-11-20 DIAGNOSIS — E11.51 TYPE 2 DIABETES MELLITUS WITH DIABETIC PERIPHERAL ANGIOPATHY WITHOUT GANGRENE, WITH LONG-TERM CURRENT USE OF INSULIN (HCC): Primary | ICD-10-CM

## 2020-11-20 DIAGNOSIS — S69.92XA INJURY OF LEFT WRIST, INITIAL ENCOUNTER: Primary | ICD-10-CM

## 2020-11-20 DIAGNOSIS — S69.92XA HAND INJURY, LEFT, INITIAL ENCOUNTER: ICD-10-CM

## 2020-11-20 PROCEDURE — 73110 X-RAY EXAM OF WRIST: CPT | Performed by: NURSE PRACTITIONER

## 2020-11-20 PROCEDURE — 3078F DIAST BP <80 MM HG: CPT | Performed by: NURSE PRACTITIONER

## 2020-11-20 PROCEDURE — 3074F SYST BP LT 130 MM HG: CPT | Performed by: NURSE PRACTITIONER

## 2020-11-20 PROCEDURE — 99214 OFFICE O/P EST MOD 30 MIN: CPT | Performed by: NURSE PRACTITIONER

## 2020-11-20 PROCEDURE — 3008F BODY MASS INDEX DOCD: CPT | Performed by: NURSE PRACTITIONER

## 2020-11-20 PROCEDURE — 73130 X-RAY EXAM OF HAND: CPT | Performed by: NURSE PRACTITIONER

## 2020-11-20 NOTE — TELEPHONE ENCOUNTER
Medardo Brewer gave patient the orders prior to leaving the office today as he had an appt with her.

## 2020-11-20 NOTE — PATIENT INSTRUCTIONS
Left wrist and hand xrays today. No acute fractures in the left hand nor left wrist.  Rest, ice, elevate left hand/wrist, ace wrap for support/compression.   If needed added compression/immobilization to left wrist then let me know and can use left wrist i

## 2020-11-20 NOTE — PROGRESS NOTES
[facility] [place]  PROGRESS NOTE  Chief Complaint:   Patient presents with:  Wrist Pain: fell and hurt his wrist      HPI:   This is a 72year old male coming in for left wrist/hand injury.     Patient tripped outside and hit brick, hit left moreira Blue Test Strips. 6-8x daily glucose testing. Dx: E11.51. Insulin Dependent 700 strip 1   • lisinopril 40 MG Oral Tab Take 1 tablet (40 mg total) by mouth daily. 90 tablet 0   • loratadine 10 MG Oral Tab Take by mouth.      • atorvastatin 10 MG Oral T apparent distress. Is joking with staff. SKIN: left hand and wrist with mild swelling along left posterior wrist and lateral aspect of palmar aspect of hand  EXTREMITIES:  , no cyanosis, no clubbing,  2+ radial and ulnar pulses on left.    Distal hand an pressure. Follow up if no significant improvement in symptoms.          Health Maintenance:  Annual Depression Screen due on 05/20/2020  Influenza Vaccine(1) due on 10/01/2020      Problem List:  Patient Active Problem List:     Benign enlargement of pros

## 2020-12-09 DIAGNOSIS — E11.51 TYPE 2 DIABETES MELLITUS WITH DIABETIC PERIPHERAL ANGIOPATHY WITHOUT GANGRENE, WITH LONG-TERM CURRENT USE OF INSULIN (HCC): ICD-10-CM

## 2020-12-09 DIAGNOSIS — Z79.4 TYPE 2 DIABETES MELLITUS WITH DIABETIC PERIPHERAL ANGIOPATHY WITHOUT GANGRENE, WITH LONG-TERM CURRENT USE OF INSULIN (HCC): ICD-10-CM

## 2020-12-09 RX ORDER — INSULIN LISPRO 100 [IU]/ML
INJECTION, SOLUTION INTRAVENOUS; SUBCUTANEOUS
Qty: 15 PEN | Refills: 1 | Status: SHIPPED | OUTPATIENT
Start: 2020-12-09 | End: 2021-08-27

## 2020-12-09 RX ORDER — INSULIN DEGLUDEC 200 U/ML
32 INJECTION, SOLUTION SUBCUTANEOUS DAILY
Qty: 15 PEN | Refills: 1 | Status: SHIPPED | OUTPATIENT
Start: 2020-12-09 | End: 2021-03-12

## 2020-12-09 NOTE — TELEPHONE ENCOUNTER
Future appt:     Your appointments     Date & Time Appointment Department [facility])    Dec 18, 2020  8:30 AM CST Follow Up Visit with Payal Huitron MD [address] [place] [address], [facility]

## 2020-12-18 ENCOUNTER — OFFICE VISIT (OUTPATIENT)
Dept: FAMILY MEDICINE CLINIC | Facility: CLINIC | Age: 70
End: 2020-12-18
Payer: COMMERCIAL

## 2020-12-18 VITALS
OXYGEN SATURATION: 99 % | RESPIRATION RATE: 16 BRPM | SYSTOLIC BLOOD PRESSURE: 128 MMHG | WEIGHT: 170 LBS | HEART RATE: 96 BPM | TEMPERATURE: 99 F | HEIGHT: 70 IN | BODY MASS INDEX: 24.34 KG/M2 | DIASTOLIC BLOOD PRESSURE: 68 MMHG

## 2020-12-18 DIAGNOSIS — I10 ESSENTIAL HYPERTENSION, BENIGN: ICD-10-CM

## 2020-12-18 DIAGNOSIS — M54.50 CHRONIC MIDLINE LOW BACK PAIN WITHOUT SCIATICA: ICD-10-CM

## 2020-12-18 DIAGNOSIS — R35.1 BENIGN PROSTATIC HYPERPLASIA WITH NOCTURIA: ICD-10-CM

## 2020-12-18 DIAGNOSIS — G89.29 CHRONIC MIDLINE LOW BACK PAIN WITHOUT SCIATICA: ICD-10-CM

## 2020-12-18 DIAGNOSIS — J30.89 NON-SEASONAL ALLERGIC RHINITIS DUE TO OTHER ALLERGIC TRIGGER: ICD-10-CM

## 2020-12-18 DIAGNOSIS — Z79.4 TYPE 2 DIABETES MELLITUS WITH DIABETIC PERIPHERAL ANGIOPATHY WITHOUT GANGRENE, WITH LONG-TERM CURRENT USE OF INSULIN (HCC): Primary | ICD-10-CM

## 2020-12-18 DIAGNOSIS — F17.200 TOBACCO DEPENDENCE: ICD-10-CM

## 2020-12-18 DIAGNOSIS — N40.1 BENIGN PROSTATIC HYPERPLASIA WITH NOCTURIA: ICD-10-CM

## 2020-12-18 DIAGNOSIS — E11.51 TYPE 2 DIABETES MELLITUS WITH DIABETIC PERIPHERAL ANGIOPATHY WITHOUT GANGRENE, WITH LONG-TERM CURRENT USE OF INSULIN (HCC): Primary | ICD-10-CM

## 2020-12-18 DIAGNOSIS — M17.0 PRIMARY OSTEOARTHRITIS OF BOTH KNEES: ICD-10-CM

## 2020-12-18 PROCEDURE — 99214 OFFICE O/P EST MOD 30 MIN: CPT | Performed by: FAMILY MEDICINE

## 2020-12-18 PROCEDURE — 3078F DIAST BP <80 MM HG: CPT | Performed by: FAMILY MEDICINE

## 2020-12-18 PROCEDURE — 3008F BODY MASS INDEX DOCD: CPT | Performed by: FAMILY MEDICINE

## 2020-12-18 PROCEDURE — 3074F SYST BP LT 130 MM HG: CPT | Performed by: FAMILY MEDICINE

## 2020-12-18 NOTE — PROGRESS NOTES
[address]  PROGRESS NOTE  Chief Complaint:   Patient presents with: Follow - Up      HPI:   This is a 72year old male coming in for follow-up on his diabetes.     He said that he is doing his best to keep good track of his blood sugars 77 of CAD with CABG   • Other (CAD) Mother          at 79 possible heart disease   • No Known Problems Brother         Healthy   • No Known Problems Sister      Allergies:    No Known Allergies        Current Meds:  Current Outpatient Medications   Med wheezing, cough or sputum. GASTROINTESTINAL:  Denies abdominal pain, nausea, vomiting, constipation, diarrhea, or blood in stool. MUSCULOSKELETAL:  Denies weakness, muscle aches, back pain, joint pain, swelling or stiffness.   NEUROLOGICAL:  Denies headac pedis pulses bilaterally.   NEURO:  No deficit, normal gait, strength and tone, sensory intact  Bilateral barefoot skin diabetic exam is normal, visualized feet and the appearance is normal.  Bilateral monofilament/sensation of both feet is normal.  Pulsati Type 2 diabetes mellitus with diabetic peripheral angiopathy without gangrene, with long-term current use of insulin (HCC)     Essential hypertension, benign     Allergic rhinitis     Tobacco dependence     Peripheral vascular disease ([facility]     Primary os

## 2021-02-03 ENCOUNTER — PATIENT OUTREACH (OUTPATIENT)
Dept: FAMILY MEDICINE CLINIC | Facility: CLINIC | Age: 71
End: 2021-02-03

## 2021-02-23 NOTE — ADDENDUM NOTE
Addended by: Alex Lopez on: 1/23/2017 06:35 PM     Modules accepted: Orders
pt seen and evaluated by PA/DC instructions/Other

## 2021-03-05 ENCOUNTER — TELEPHONE (OUTPATIENT)
Dept: FAMILY MEDICINE CLINIC | Facility: CLINIC | Age: 71
End: 2021-03-05

## 2021-03-05 LAB
A1C: 6.3 %
ALBUMIN/GLOBULIN RATIO: 1.5
ALBUMIN: 3.8 G/DL
ALKALINE PHOSPHATASE: 61
ALT: 18
AST: 15
BILIRUBIN, TOTAL: 0.5 MG/DL
BUN: 11
CALCIUM: 9.2
CARBON DIOXIDE: 28
CHLORIDE: 104
CREATININE: 0.77 MG/DL
GFR NON-AFRICAN AMERICAN: 92
GLOBULIN: 2.6
GLUCOSE: 145
POTASSIUM: 4.3
SODIUM: 137
TOTAL PROTEIN: 6.4

## 2021-03-05 NOTE — TELEPHONE ENCOUNTER
Pt informed. Pt states he had his Moderna vaccines on 1/29/21 and 2/26/21.- updated immunization report. Pt mentioned that his blood sugars went up after vaccines, but states they are starting to come back down now.   Pt states he had no diet change and

## 2021-03-05 NOTE — TELEPHONE ENCOUNTER
----- Message from Yessica Cedeño MD sent at 3/5/2021  1:04 PM CST -----  Blood sugar is one forty-five. Kidney function is excellent. Hemoglobin A1c went up slightly to 6.3.

## 2021-03-05 NOTE — PROGRESS NOTES
Good news on the blood work. Hemoglobin A1c went up slightly but only a little bit. Blood sugar also went up slightly. Please continue to keep a close eye on it.

## 2021-03-11 ENCOUNTER — TELEPHONE (OUTPATIENT)
Dept: FAMILY MEDICINE CLINIC | Facility: CLINIC | Age: 71
End: 2021-03-11

## 2021-03-11 DIAGNOSIS — E11.51 TYPE 2 DIABETES MELLITUS WITH DIABETIC PERIPHERAL ANGIOPATHY WITHOUT GANGRENE, WITH LONG-TERM CURRENT USE OF INSULIN (HCC): ICD-10-CM

## 2021-03-11 DIAGNOSIS — I10 ESSENTIAL HYPERTENSION, BENIGN: ICD-10-CM

## 2021-03-11 DIAGNOSIS — Z79.4 TYPE 2 DIABETES MELLITUS WITH DIABETIC PERIPHERAL ANGIOPATHY WITHOUT GANGRENE, WITH LONG-TERM CURRENT USE OF INSULIN (HCC): ICD-10-CM

## 2021-03-11 NOTE — TELEPHONE ENCOUNTER
Future appt:     Last Appointment with provider:   12/18/2020; Return in about 6 months (around 6/18/2021).     Last appointment at [facility]:  12/18/2020  CHOLESTEROL, TOTAL (mg/dL)   Date Value   05/27/2020 142     HDL CHOLESTEROL (mg/dL)   Date Value

## 2021-03-11 NOTE — TELEPHONE ENCOUNTER
Needs refill of tresiba, atorvastatin, lisinopril, and nasal spray [facility] [place], [place].

## 2021-03-12 RX ORDER — FLUTICASONE PROPIONATE 50 MCG
SPRAY, SUSPENSION (ML) NASAL
Qty: 3 INHALER | Refills: 1 | Status: SHIPPED | OUTPATIENT
Start: 2021-03-12

## 2021-03-12 RX ORDER — LISINOPRIL 40 MG/1
40 TABLET ORAL DAILY
Qty: 90 TABLET | Refills: 1 | Status: SHIPPED | OUTPATIENT
Start: 2021-03-12 | End: 2021-06-01

## 2021-03-12 RX ORDER — INSULIN DEGLUDEC 200 U/ML
32 INJECTION, SOLUTION SUBCUTANEOUS DAILY
Qty: 15 PEN | Refills: 1 | Status: SHIPPED | OUTPATIENT
Start: 2021-03-12 | End: 2021-11-22

## 2021-03-12 RX ORDER — ATORVASTATIN CALCIUM 10 MG/1
10 TABLET, FILM COATED ORAL DAILY
Qty: 90 TABLET | Refills: 1 | Status: SHIPPED | OUTPATIENT
Start: 2021-03-12 | End: 2021-11-22

## 2021-05-05 ENCOUNTER — TELEPHONE (OUTPATIENT)
Dept: FAMILY MEDICINE CLINIC | Facility: CLINIC | Age: 71
End: 2021-05-05

## 2021-05-05 DIAGNOSIS — Z79.4 TYPE 2 DIABETES MELLITUS WITH DIABETIC PERIPHERAL ANGIOPATHY WITHOUT GANGRENE, WITH LONG-TERM CURRENT USE OF INSULIN (HCC): Primary | ICD-10-CM

## 2021-05-05 DIAGNOSIS — I10 ESSENTIAL HYPERTENSION, BENIGN: ICD-10-CM

## 2021-05-05 DIAGNOSIS — R35.1 BENIGN PROSTATIC HYPERPLASIA WITH NOCTURIA: ICD-10-CM

## 2021-05-05 DIAGNOSIS — M17.0 PRIMARY OSTEOARTHRITIS OF BOTH KNEES: ICD-10-CM

## 2021-05-05 DIAGNOSIS — E11.51 TYPE 2 DIABETES MELLITUS WITH DIABETIC PERIPHERAL ANGIOPATHY WITHOUT GANGRENE, WITH LONG-TERM CURRENT USE OF INSULIN (HCC): Primary | ICD-10-CM

## 2021-05-05 DIAGNOSIS — N40.1 BENIGN PROSTATIC HYPERPLASIA WITH NOCTURIA: ICD-10-CM

## 2021-05-05 NOTE — TELEPHONE ENCOUNTER
Would like Dr to please fax his lab orders to [facility].       Your appointments     Date & Time Appointment Department [facility])    May 21, 2021  8:00 AM CDT Follow Up Visit with Mauro Chaparro MD [address] [place] [address]

## 2021-05-17 ENCOUNTER — TELEPHONE (OUTPATIENT)
Dept: FAMILY MEDICINE CLINIC | Facility: CLINIC | Age: 71
End: 2021-05-17

## 2021-05-17 NOTE — TELEPHONE ENCOUNTER
[facility]. . did not have orders. They did his bloodwork but need orders faxed to them.  patient wants to be called after you send orders to [facility]

## 2021-05-21 ENCOUNTER — OFFICE VISIT (OUTPATIENT)
Dept: FAMILY MEDICINE CLINIC | Facility: CLINIC | Age: 71
End: 2021-05-21
Payer: COMMERCIAL

## 2021-05-21 VITALS
TEMPERATURE: 97 F | RESPIRATION RATE: 16 BRPM | DIASTOLIC BLOOD PRESSURE: 70 MMHG | WEIGHT: 181.63 LBS | BODY MASS INDEX: 26 KG/M2 | HEART RATE: 78 BPM | HEIGHT: 70 IN | OXYGEN SATURATION: 96 % | SYSTOLIC BLOOD PRESSURE: 128 MMHG

## 2021-05-21 DIAGNOSIS — M17.0 PRIMARY OSTEOARTHRITIS OF BOTH KNEES: ICD-10-CM

## 2021-05-21 DIAGNOSIS — Z79.4 TYPE 2 DIABETES MELLITUS WITH DIABETIC PERIPHERAL ANGIOPATHY WITHOUT GANGRENE, WITH LONG-TERM CURRENT USE OF INSULIN (HCC): ICD-10-CM

## 2021-05-21 DIAGNOSIS — I73.9 PAD (PERIPHERAL ARTERY DISEASE) (HCC): ICD-10-CM

## 2021-05-21 DIAGNOSIS — F17.200 TOBACCO DEPENDENCE: ICD-10-CM

## 2021-05-21 DIAGNOSIS — E11.51 TYPE 2 DIABETES MELLITUS WITH DIABETIC PERIPHERAL ANGIOPATHY WITHOUT GANGRENE, WITH LONG-TERM CURRENT USE OF INSULIN (HCC): ICD-10-CM

## 2021-05-21 DIAGNOSIS — Z00.00 ENCOUNTER FOR ANNUAL HEALTH EXAMINATION: Primary | ICD-10-CM

## 2021-05-21 DIAGNOSIS — I10 ESSENTIAL HYPERTENSION, BENIGN: ICD-10-CM

## 2021-05-21 DIAGNOSIS — M54.50 CHRONIC MIDLINE LOW BACK PAIN WITHOUT SCIATICA: ICD-10-CM

## 2021-05-21 DIAGNOSIS — N40.1 BENIGN PROSTATIC HYPERPLASIA WITH NOCTURIA: ICD-10-CM

## 2021-05-21 DIAGNOSIS — G89.29 CHRONIC MIDLINE LOW BACK PAIN WITHOUT SCIATICA: ICD-10-CM

## 2021-05-21 DIAGNOSIS — R35.1 BENIGN PROSTATIC HYPERPLASIA WITH NOCTURIA: ICD-10-CM

## 2021-05-21 DIAGNOSIS — J30.89 NON-SEASONAL ALLERGIC RHINITIS DUE TO OTHER ALLERGIC TRIGGER: ICD-10-CM

## 2021-05-21 PROBLEM — M99.79 NARROWING OF INTERVERTEBRAL DISC SPACE: Status: RESOLVED | Noted: 2017-03-17 | Resolved: 2021-05-21

## 2021-05-21 PROCEDURE — 96160 PT-FOCUSED HLTH RISK ASSMT: CPT | Performed by: FAMILY MEDICINE

## 2021-05-21 PROCEDURE — 3008F BODY MASS INDEX DOCD: CPT | Performed by: FAMILY MEDICINE

## 2021-05-21 PROCEDURE — 99397 PER PM REEVAL EST PAT 65+ YR: CPT | Performed by: FAMILY MEDICINE

## 2021-05-21 PROCEDURE — G0439 PPPS, SUBSEQ VISIT: HCPCS | Performed by: FAMILY MEDICINE

## 2021-05-21 PROCEDURE — 3078F DIAST BP <80 MM HG: CPT | Performed by: FAMILY MEDICINE

## 2021-05-21 PROCEDURE — 3074F SYST BP LT 130 MM HG: CPT | Performed by: FAMILY MEDICINE

## 2021-05-21 NOTE — PROGRESS NOTES
REASON FOR VISIT:    Raine Gaytan is a 80year old male who presents for a MA Supervisit. He feels that he is doing well overall. He is not having any low blood sugar reactions. His blood sugar is almost always between 100 and 150.   He feels a littl daily glucose testing. Dx:  E11.51  Insulin Dependent 700 each 1   • Insulin Lispro, 1 Unit Dial, 100 UNIT/ML Subcutaneous Solution Pen-injector Sliding Scale.   6-16u Before Each Meal.  Up to 50u  daily 15 pen 1   • Insulin Pen Needle (Mee Overall 2 - No  Has your appetite been poor?: No  Type of Diet: Diabetic  How does the patient maintain a good energy level?: Appropriate Exercise;Daily Walks; Other  How would you describe your daily physical activity?: Moderate  How would you describe your curren Sugar (FSB)Annually GLUCOSE (mg/dL)   Date Value   05/17/2021 103 (H)      Cardiovascular Disease Screening    LDL Annually LDL-CHOLESTEROL (mg/dL (calc))   Date Value   05/17/2021 78       EKG - w/ Initial Preventative Physical Exam only, or if medically Mother          at 79 possible heart disease   • No Known Problems Brother         Healthy   • No Known Problems Sister      Immunization History      Immunization History  Administered            Date(s) Administered    Covid-19 Vaccine Moderna 100 mc m)   Wt 181 lb 9.6 oz (82.4 kg)   SpO2 96%   BMI 26.06 kg/m²    > BP Readings from Last 3 Encounters:  05/21/21 : 128/70  12/18/20 : 128/68  11/20/20 : 124/64    GENERAL: well developed, well nourished, in no apparent distress   SKIN: no rashes, no suspici arterial disease. He does not have any leg symptoms now. 5. Tobacco dependence  Unfortunately he continues to smoke. He realizes that he needs to quit. 6. Primary osteoarthritis of both knees  He has osteoarthritis in both knees.   No new treatment

## 2021-05-21 NOTE — PATIENT INSTRUCTIONS
Recommended Websites for Advanced Directives    SeekAlumni.no. org/publications/Documents/personal_dec. pdf  An information packet, including necessary form from the [place] [address] website. http://www. [place].state. [place]/public/books/adv

## 2021-06-01 DIAGNOSIS — I10 ESSENTIAL HYPERTENSION, BENIGN: ICD-10-CM

## 2021-06-01 RX ORDER — LISINOPRIL 40 MG/1
TABLET ORAL
Qty: 90 TABLET | Refills: 1 | Status: SHIPPED | OUTPATIENT
Start: 2021-06-01 | End: 2021-11-22

## 2021-06-01 NOTE — TELEPHONE ENCOUNTER
Lisinopril: 3/12/21     Return in 6 months (on 11/21/2021).   Future appt:    Last Appointment with provider:   5/21/2021  Last appointment at [facility]:  5/21/2021  CHOLESTEROL, TOTAL (mg/dL)   Date Value   05/17/2021 152     HDL CHOLESTEROL (mg/dL)   D

## 2021-08-19 ENCOUNTER — TELEPHONE (OUTPATIENT)
Dept: FAMILY MEDICINE CLINIC | Facility: CLINIC | Age: 71
End: 2021-08-19

## 2021-08-19 DIAGNOSIS — E11.51 TYPE 2 DIABETES MELLITUS WITH DIABETIC PERIPHERAL ANGIOPATHY WITHOUT GANGRENE, WITH LONG-TERM CURRENT USE OF INSULIN (HCC): Primary | ICD-10-CM

## 2021-08-19 DIAGNOSIS — Z79.4 TYPE 2 DIABETES MELLITUS WITH DIABETIC PERIPHERAL ANGIOPATHY WITHOUT GANGRENE, WITH LONG-TERM CURRENT USE OF INSULIN (HCC): Primary | ICD-10-CM

## 2021-08-19 NOTE — TELEPHONE ENCOUNTER
needs orders for A1C only faxed to [facility] for appt there tomorrow @ 028hg           No future appointments.

## 2021-08-19 NOTE — TELEPHONE ENCOUNTER
Patient requesting order for A1C only at [address] [facility]. Has an appointment scheduled there tomorrow. Last A1C drawn on 5/17/21 - 6.1   Future appt:     Last Appointment with provider:   5/21/2021, annual [phone] in 6 months (on 11/21/2021).      Last appo

## 2021-08-19 NOTE — TELEPHONE ENCOUNTER
Please fax order. Also, the patient received his second shingrix vaccine and is finished with series, please edit to close care gap for this vaccine.

## 2021-08-20 ENCOUNTER — TELEPHONE (OUTPATIENT)
Dept: FAMILY MEDICINE CLINIC | Facility: CLINIC | Age: 71
End: 2021-08-20

## 2021-08-20 DIAGNOSIS — E11.51 TYPE 2 DIABETES MELLITUS WITH DIABETIC PERIPHERAL ANGIOPATHY WITHOUT GANGRENE, WITH LONG-TERM CURRENT USE OF INSULIN (HCC): Primary | ICD-10-CM

## 2021-08-20 DIAGNOSIS — Z79.4 TYPE 2 DIABETES MELLITUS WITH DIABETIC PERIPHERAL ANGIOPATHY WITHOUT GANGRENE, WITH LONG-TERM CURRENT USE OF INSULIN (HCC): Primary | ICD-10-CM

## 2021-08-20 NOTE — TELEPHONE ENCOUNTER
Please advise Referral/[facility] Diabetes Ed.   Delilah Alegre, [place], 08/20/21, 4:27 PM

## 2021-08-21 LAB — HEMOGLOBIN A1C: 6.4 % OF TOTAL HGB

## 2021-08-27 ENCOUNTER — TELEPHONE (OUTPATIENT)
Dept: FAMILY MEDICINE CLINIC | Facility: CLINIC | Age: 71
End: 2021-08-27

## 2021-08-27 RX ORDER — INSULIN LISPRO-AABC 100 [IU]/ML
INJECTION, SOLUTION SUBCUTANEOUS
Qty: 30 ML | Refills: 1 | Status: SHIPPED | OUTPATIENT
Start: 2021-08-27 | End: 2021-11-22

## 2021-08-27 NOTE — TELEPHONE ENCOUNTER
Fax received from diabetes education center. They have requested switching his insulin to [employer]. I will send in a new prescription. This will replace his rapid acting lispro insulin.

## 2021-08-30 DIAGNOSIS — E11.51 TYPE 2 DIABETES MELLITUS WITH DIABETIC PERIPHERAL ANGIOPATHY WITHOUT GANGRENE, WITH LONG-TERM CURRENT USE OF INSULIN (HCC): ICD-10-CM

## 2021-08-30 DIAGNOSIS — Z79.4 TYPE 2 DIABETES MELLITUS WITH DIABETIC PERIPHERAL ANGIOPATHY WITHOUT GANGRENE, WITH LONG-TERM CURRENT USE OF INSULIN (HCC): ICD-10-CM

## 2021-08-30 RX ORDER — PEN NEEDLE, DIABETIC 32GX 5/32"
NEEDLE, DISPOSABLE MISCELLANEOUS
Qty: 600 EACH | Refills: 1 | Status: SHIPPED | OUTPATIENT
Start: 2021-08-30 | End: 2021-11-23

## 2021-08-30 NOTE — TELEPHONE ENCOUNTER
Future appt:    Last Appointment with provider:  5/21/21  Last appointment at [facility]:  5/21/2021  CHOLESTEROL, TOTAL (mg/dL)   Date Value   05/17/2021 152     HDL CHOLESTEROL (mg/dL)   Date Value   05/17/2021 58     LDL-CHOLESTEROL (mg/dL (calc))   D

## 2021-09-07 ENCOUNTER — TELEPHONE (OUTPATIENT)
Dept: FAMILY MEDICINE CLINIC | Facility: CLINIC | Age: 71
End: 2021-09-07

## 2021-09-07 DIAGNOSIS — Z20.822 CLOSE EXPOSURE TO COVID-19 VIRUS: Primary | ICD-10-CM

## 2021-09-07 NOTE — TELEPHONE ENCOUNTER
Exposed to Persaud Red a couple weeks ago. Just wanting to be sure he is Covid19 free at this time.   Julia Blanchard CMA, 09/07/21, 9:52 AM.    Future appt:    Last Appointment with provider:   5/21/2021  Last appointment at [facility]:  5/21/2021  CHOLESTEROL

## 2021-09-08 ENCOUNTER — TELEPHONE (OUTPATIENT)
Dept: FAMILY MEDICINE CLINIC | Facility: CLINIC | Age: 71
End: 2021-09-08

## 2021-09-08 NOTE — TELEPHONE ENCOUNTER
Patient informed Negative Covid19 Testing.   Pita Lynch, [facility], 09/08/21, 3:17 PM

## 2021-09-29 ENCOUNTER — TELEPHONE (OUTPATIENT)
Dept: FAMILY MEDICINE CLINIC | Facility: CLINIC | Age: 71
End: 2021-09-29

## 2021-09-29 NOTE — TELEPHONE ENCOUNTER
JERAMYI: Pt received flu shot today at [address] [facility] in [place]. Would like Dr/ nurse to please update chart.

## 2021-09-30 NOTE — TELEPHONE ENCOUNTER
Spoke with patient. States he is unsure if he received the high dose or the standard dose. States he would assume the high dose but will double check and return my call.

## 2021-09-30 NOTE — TELEPHONE ENCOUNTER
----- Message from Zachary Maldonado sent at 9/30/2021 12:46 PM CDT -----  Pt called back to let toan know that he had the high dose flu shot  [phone]

## 2021-11-22 DIAGNOSIS — Z79.4 TYPE 2 DIABETES MELLITUS WITH DIABETIC PERIPHERAL ANGIOPATHY WITHOUT GANGRENE, WITH LONG-TERM CURRENT USE OF INSULIN (HCC): ICD-10-CM

## 2021-11-22 DIAGNOSIS — E11.51 TYPE 2 DIABETES MELLITUS WITH DIABETIC PERIPHERAL ANGIOPATHY WITHOUT GANGRENE, WITH LONG-TERM CURRENT USE OF INSULIN (HCC): ICD-10-CM

## 2021-11-22 DIAGNOSIS — I10 ESSENTIAL HYPERTENSION, BENIGN: ICD-10-CM

## 2021-11-22 RX ORDER — ATORVASTATIN CALCIUM 10 MG/1
10 TABLET, FILM COATED ORAL DAILY
Qty: 90 TABLET | Refills: 0 | Status: SHIPPED | OUTPATIENT
Start: 2021-11-22

## 2021-11-22 RX ORDER — INSULIN LISPRO-AABC 100 [IU]/ML
INJECTION, SOLUTION SUBCUTANEOUS
Qty: 15 EACH | Refills: 1 | Status: SHIPPED | OUTPATIENT
Start: 2021-11-22

## 2021-11-22 RX ORDER — LISINOPRIL 40 MG/1
40 TABLET ORAL DAILY
Qty: 90 TABLET | Refills: 0 | Status: SHIPPED | OUTPATIENT
Start: 2021-11-22

## 2021-11-22 RX ORDER — INSULIN DEGLUDEC 200 U/ML
32 INJECTION, SOLUTION SUBCUTANEOUS DAILY
Qty: 15 EACH | Refills: 0 | Status: SHIPPED | OUTPATIENT
Start: 2021-11-22

## 2021-11-22 NOTE — TELEPHONE ENCOUNTER
Refill sent–please have patient schedule appointment with Dr. Kenyatta Uriostegui-  Order for A1c printed please fax to [address] in [place]

## 2021-11-22 NOTE — TELEPHONE ENCOUNTER
needs refill on lisinopril, atorvastin , both insulins,needles to [facility] in [address] [facility].  [facility]

## 2021-11-23 DIAGNOSIS — Z79.4 TYPE 2 DIABETES MELLITUS WITH DIABETIC PERIPHERAL ANGIOPATHY WITHOUT GANGRENE, WITH LONG-TERM CURRENT USE OF INSULIN (HCC): ICD-10-CM

## 2021-11-23 DIAGNOSIS — E11.51 TYPE 2 DIABETES MELLITUS WITH DIABETIC PERIPHERAL ANGIOPATHY WITHOUT GANGRENE, WITH LONG-TERM CURRENT USE OF INSULIN (HCC): ICD-10-CM

## 2021-11-23 RX ORDER — PEN NEEDLE, DIABETIC 32GX 5/32"
1 NEEDLE, DISPOSABLE MISCELLANEOUS
Qty: 600 EACH | Refills: 3 | Status: SHIPPED | OUTPATIENT
Start: 2021-11-23

## 2021-11-23 NOTE — TELEPHONE ENCOUNTER
this Rx was missed please refill   Techlite Pen Trona 32G - Call into [facility]

## 2021-11-23 NOTE — TELEPHONE ENCOUNTER
Insulin was sent in yesterday. Needing needles.     Future appt:    Last Appointment with provider:   Visit date not found  Last appointment at [facility]:  Visit date not found  CHOLESTEROL, TOTAL (mg/dL)   Date Value   05/17/2021 152     HDL CHOLESTERO

## 2021-11-29 ENCOUNTER — TELEPHONE (OUTPATIENT)
Dept: FAMILY MEDICINE CLINIC | Facility: CLINIC | Age: 71
End: 2021-11-29

## 2021-11-29 RX ORDER — PEN NEEDLE, DIABETIC 32GX 5/32"
6 NEEDLE, DISPOSABLE MISCELLANEOUS DAILY
COMMUNITY
End: 2021-11-29

## 2021-11-29 RX ORDER — PEN NEEDLE, DIABETIC 32GX 5/32"
6 NEEDLE, DISPOSABLE MISCELLANEOUS DAILY
Qty: 600 EACH | Refills: 1 | Status: SHIPPED | OUTPATIENT
Start: 2021-11-29

## 2021-11-29 NOTE — TELEPHONE ENCOUNTER
Pharmacy cant get the (TECHLITE PEN NEEDLES) need to send new rx for BD fany 32 neda needles instead. Pt states he used these previously. [facility] [place], [place].

## 2021-12-08 ENCOUNTER — TELEPHONE (OUTPATIENT)
Dept: FAMILY MEDICINE CLINIC | Facility: CLINIC | Age: 71
End: 2021-12-08

## 2021-12-08 DIAGNOSIS — Z79.4 TYPE 2 DIABETES MELLITUS WITH DIABETIC PERIPHERAL ANGIOPATHY WITHOUT GANGRENE, WITH LONG-TERM CURRENT USE OF INSULIN (HCC): Primary | ICD-10-CM

## 2021-12-08 DIAGNOSIS — E11.51 TYPE 2 DIABETES MELLITUS WITH DIABETIC PERIPHERAL ANGIOPATHY WITHOUT GANGRENE, WITH LONG-TERM CURRENT USE OF INSULIN (HCC): Primary | ICD-10-CM

## 2021-12-08 NOTE — TELEPHONE ENCOUNTER
Fax received from [facility]. Hemoglobin A1c is 6.5. Previous hemoglobin A1c on August 20, 2021 was 6.4. Impression: Adequate control of diabetes. Plan: Continue the same diabetes plan. Hemoglobin A1c will be due again in March.   The complete diabetes

## 2022-02-07 ENCOUNTER — TELEPHONE (OUTPATIENT)
Dept: FAMILY MEDICINE CLINIC | Facility: CLINIC | Age: 72
End: 2022-02-07

## 2022-02-07 NOTE — TELEPHONE ENCOUNTER
Would like hemoglobin A1C sent to "[employer]" / [facility] Tiffanie Nichols , [address] / phone num [phone]  / fax [phone]. Would like call once order is sent.

## 2022-02-25 LAB
ALBUMIN/GLOBULIN RATIO: 1.4 (ref 1–2.5)
ALBUMIN: 4.1 G/DL (ref 3.6–5.1)
ALKALINE PHOSPHATASE: 70 (ref 35–144)
ALT (SGPT): 22 IU/L (ref 9–46)
AST (SGOT): 14 IU/L (ref 10–35)
BILIRUBIN, TOTAL: 0.5 MG/DL (ref 0.2–1.2)
BUN: 13 (ref 7–25)
CALCIUM: 9.3 (ref 8.6–10.3)
CARBON DIOXIDE: 27 (ref 20–32)
CHLORIDE: 103 (ref 98–110)
CREATININE: 0.71 MG/DL (ref 0.7–1.18)
GLOBULIN: 3 (ref 1.9–3.7)
GLOM FILT RATE, EST: 95
GLUCOSE: 91 (ref 65–99)
HEMOGLOBIN A1C: 6.5 %
POTASSIUM: 4.8 (ref 3.5–5.3)
PROTEIN, TOTAL: 7.1 (ref 6.1–8.1)
SODIUM: 138 (ref 135–146)

## 2022-02-28 ENCOUNTER — TELEPHONE (OUTPATIENT)
Dept: FAMILY MEDICINE CLINIC | Facility: CLINIC | Age: 72
End: 2022-02-28

## 2022-05-31 ENCOUNTER — TELEPHONE (OUTPATIENT)
Dept: FAMILY MEDICINE CLINIC | Facility: CLINIC | Age: 72
End: 2022-05-31

## 2022-05-31 DIAGNOSIS — R35.1 BENIGN PROSTATIC HYPERPLASIA WITH NOCTURIA: ICD-10-CM

## 2022-05-31 DIAGNOSIS — E11.51 TYPE 2 DIABETES MELLITUS WITH DIABETIC PERIPHERAL ANGIOPATHY WITHOUT GANGRENE, WITH LONG-TERM CURRENT USE OF INSULIN (HCC): Primary | ICD-10-CM

## 2022-05-31 DIAGNOSIS — I73.9 PAD (PERIPHERAL ARTERY DISEASE) (HCC): ICD-10-CM

## 2022-05-31 DIAGNOSIS — N40.1 BENIGN PROSTATIC HYPERPLASIA WITH NOCTURIA: ICD-10-CM

## 2022-05-31 DIAGNOSIS — Z79.4 TYPE 2 DIABETES MELLITUS WITH DIABETIC PERIPHERAL ANGIOPATHY WITHOUT GANGRENE, WITH LONG-TERM CURRENT USE OF INSULIN (HCC): Primary | ICD-10-CM

## 2022-05-31 DIAGNOSIS — E55.9 VITAMIN D DEFICIENCY: ICD-10-CM

## 2022-05-31 DIAGNOSIS — I10 ESSENTIAL HYPERTENSION, BENIGN: ICD-10-CM

## 2022-05-31 NOTE — TELEPHONE ENCOUNTER
patient requesting lab order sent to [address] [place] [address] in [place] prior to 6/15 visit

## 2022-05-31 NOTE — TELEPHONE ENCOUNTER
Patient has MA supervisit with Dr. Rickey De León on 6/15. Patient is requesting BW orders sent to [facility] ahead of appointment. Also, would like to come in to  hard copy of orders this week.

## 2022-06-03 ENCOUNTER — TELEPHONE (OUTPATIENT)
Dept: FAMILY MEDICINE CLINIC | Facility: CLINIC | Age: 72
End: 2022-06-03

## 2022-06-03 DIAGNOSIS — E11.51 TYPE 2 DIABETES MELLITUS WITH DIABETIC PERIPHERAL ANGIOPATHY WITHOUT GANGRENE, WITH LONG-TERM CURRENT USE OF INSULIN (HCC): Primary | ICD-10-CM

## 2022-06-03 DIAGNOSIS — Z79.4 TYPE 2 DIABETES MELLITUS WITH DIABETIC PERIPHERAL ANGIOPATHY WITHOUT GANGRENE, WITH LONG-TERM CURRENT USE OF INSULIN (HCC): Primary | ICD-10-CM

## 2022-06-06 ENCOUNTER — TELEPHONE (OUTPATIENT)
Dept: FAMILY MEDICINE CLINIC | Facility: CLINIC | Age: 72
End: 2022-06-06

## 2022-06-06 RX ORDER — INSULIN LISPRO 100 [IU]/ML
INJECTION, SOLUTION INTRAVENOUS; SUBCUTANEOUS
Qty: 45 ML | Refills: 1 | Status: SHIPPED | OUTPATIENT
Start: 2022-06-06

## 2022-06-13 PROCEDURE — 3061F NEG MICROALBUMINURIA REV: CPT | Performed by: FAMILY MEDICINE

## 2022-06-13 PROCEDURE — 3044F HG A1C LEVEL LT 7.0%: CPT | Performed by: FAMILY MEDICINE

## 2022-06-14 ENCOUNTER — TELEPHONE (OUTPATIENT)
Dept: FAMILY MEDICINE CLINIC | Facility: CLINIC | Age: 72
End: 2022-06-14

## 2022-06-14 LAB
ABSOLUTE BASOPHILS: 20 CELLS/UL (ref 0–200)
ABSOLUTE EOSINOPHILS: 204 CELLS/UL (ref 15–500)
ABSOLUTE LYMPHOCYTES: 1040 CELLS/UL (ref 850–3900)
ABSOLUTE MONOCYTES: 537 CELLS/UL (ref 200–950)
ABSOLUTE NEUTROPHILS: 4998 CELLS/UL (ref 1500–7800)
ALBUMIN/GLOBULIN RATIO: 1.6 (CALC) (ref 1–2.5)
ALBUMIN: 3.9 G/DL (ref 3.6–5.1)
ALKALINE PHOSPHATASE: 80 U/L (ref 35–144)
ALT: 25 U/L (ref 9–46)
AST: 21 U/L (ref 10–35)
BASOPHILS: 0.3 %
BILIRUBIN, TOTAL: 0.5 MG/DL (ref 0.2–1.2)
BUN: 11 MG/DL (ref 7–25)
CALCIUM: 9.2 MG/DL (ref 8.6–10.3)
CARBON DIOXIDE: 28 MMOL/L (ref 20–32)
CHLORIDE: 99 MMOL/L (ref 98–110)
CHOL/HDLC RATIO: 2.5 (CALC)
CHOLESTEROL, TOTAL: 136 MG/DL
CREATININE, RANDOM URINE: 42 MG/DL (ref 20–320)
CREATININE: 0.75 MG/DL (ref 0.7–1.18)
EGFR IF AFRICN AM: 107 ML/MIN/1.73M2
EGFR IF NONAFRICN AM: 92 ML/MIN/1.73M2
EOSINOPHILS: 3 %
GLOBULIN: 2.5 G/DL (CALC) (ref 1.9–3.7)
GLUCOSE: 123 MG/DL (ref 65–99)
HDL CHOLESTEROL: 55 MG/DL
HEMATOCRIT: 45.4 % (ref 38.5–50)
HEMOGLOBIN A1C: 6.1 % OF TOTAL HGB
HEMOGLOBIN: 15.4 G/DL (ref 13.2–17.1)
LDL-CHOLESTEROL: 68 MG/DL (CALC)
LYMPHOCYTES: 15.3 %
MCH: 31.8 PG (ref 27–33)
MCHC: 33.9 G/DL (ref 32–36)
MCV: 93.8 FL (ref 80–100)
MICROALBUMIN: <0.2 MG/DL
MONOCYTES: 7.9 %
MPV: 9.9 FL (ref 7.5–12.5)
NEUTROPHILS: 73.5 %
NON-HDL CHOLESTEROL: 81 MG/DL (CALC)
PLATELET COUNT: 266 THOUSAND/UL (ref 140–400)
POTASSIUM: 4.1 MMOL/L (ref 3.5–5.3)
PROTEIN, TOTAL: 6.4 G/DL (ref 6.1–8.1)
PSA, TOTAL: 1.37 NG/ML
RDW: 12.8 % (ref 11–15)
RED BLOOD CELL COUNT: 4.84 MILLION/UL (ref 4.2–5.8)
SODIUM: 133 MMOL/L (ref 135–146)
TRIGLYCERIDES: 58 MG/DL
TSH: 6.12 MIU/L (ref 0.4–4.5)
VITAMIN D, 25-OH, TOTAL: 45 NG/ML (ref 30–100)
WHITE BLOOD CELL COUNT: 6.8 THOUSAND/UL (ref 3.8–10.8)

## 2022-06-14 NOTE — TELEPHONE ENCOUNTER
----- Message from Jeri Jiménez MD sent at 6/14/2022  9:38 AM CDT -----  The labs look good overall. CBC is normal with a hemoglobin of 15.4. White blood count is normal at 6.8. Blood sugar is 123. Kidney function is normal with a GFR of 92. Sodium level is slightly low at 133. Hemoglobin A1c is excellent at 6.1. Microalbumin to creatinine ratio was normal.  Cholesterol is excellent at 136. PSA level is normal at 1.37 which means there is no sign of prostate cancer. TSH is elevated at 6. 12. This indicates that there may be an issue with low thyroid in the future. For now, I recommend watching and rechecking in 1 year. Vitamin D level is now excellent at 45. Impression: Sodium level is slightly low at 133. TSH is slightly elevated at 6. 12. The rest of the labs look great. Plan: Recommend continuing the same medications. Recheck thyroid again in 1 year.

## 2022-06-15 ENCOUNTER — OFFICE VISIT (OUTPATIENT)
Dept: FAMILY MEDICINE CLINIC | Facility: CLINIC | Age: 72
End: 2022-06-15
Payer: COMMERCIAL

## 2022-06-15 VITALS
SYSTOLIC BLOOD PRESSURE: 138 MMHG | BODY MASS INDEX: 26.22 KG/M2 | TEMPERATURE: 98 F | DIASTOLIC BLOOD PRESSURE: 72 MMHG | RESPIRATION RATE: 12 BRPM | WEIGHT: 177 LBS | HEIGHT: 69 IN | HEART RATE: 80 BPM

## 2022-06-15 DIAGNOSIS — I10 ESSENTIAL HYPERTENSION, BENIGN: ICD-10-CM

## 2022-06-15 DIAGNOSIS — J30.89 NON-SEASONAL ALLERGIC RHINITIS DUE TO OTHER ALLERGIC TRIGGER: ICD-10-CM

## 2022-06-15 DIAGNOSIS — E11.51 TYPE 2 DIABETES MELLITUS WITH DIABETIC PERIPHERAL ANGIOPATHY WITHOUT GANGRENE, WITH LONG-TERM CURRENT USE OF INSULIN (HCC): ICD-10-CM

## 2022-06-15 DIAGNOSIS — Z00.00 ENCOUNTER FOR ANNUAL HEALTH EXAMINATION: Primary | ICD-10-CM

## 2022-06-15 DIAGNOSIS — N40.1 BENIGN PROSTATIC HYPERPLASIA WITH NOCTURIA: ICD-10-CM

## 2022-06-15 DIAGNOSIS — M54.50 CHRONIC MIDLINE LOW BACK PAIN WITHOUT SCIATICA: ICD-10-CM

## 2022-06-15 DIAGNOSIS — R35.1 BENIGN PROSTATIC HYPERPLASIA WITH NOCTURIA: ICD-10-CM

## 2022-06-15 DIAGNOSIS — Z11.59 NEED FOR HEPATITIS C SCREENING TEST: ICD-10-CM

## 2022-06-15 DIAGNOSIS — Z79.4 TYPE 2 DIABETES MELLITUS WITH DIABETIC PERIPHERAL ANGIOPATHY WITHOUT GANGRENE, WITH LONG-TERM CURRENT USE OF INSULIN (HCC): ICD-10-CM

## 2022-06-15 DIAGNOSIS — R79.89 ABNORMAL THYROID BLOOD TEST: ICD-10-CM

## 2022-06-15 DIAGNOSIS — F17.200 TOBACCO DEPENDENCE: ICD-10-CM

## 2022-06-15 DIAGNOSIS — G89.29 CHRONIC MIDLINE LOW BACK PAIN WITHOUT SCIATICA: ICD-10-CM

## 2022-06-15 DIAGNOSIS — E78.00 HYPERCHOLESTEROLEMIA: ICD-10-CM

## 2022-06-15 DIAGNOSIS — M17.0 PRIMARY OSTEOARTHRITIS OF BOTH KNEES: ICD-10-CM

## 2022-06-15 DIAGNOSIS — I73.9 PAD (PERIPHERAL ARTERY DISEASE) (HCC): ICD-10-CM

## 2022-06-15 PROCEDURE — 3008F BODY MASS INDEX DOCD: CPT | Performed by: FAMILY MEDICINE

## 2022-06-15 PROCEDURE — 99397 PER PM REEVAL EST PAT 65+ YR: CPT | Performed by: FAMILY MEDICINE

## 2022-06-15 PROCEDURE — 3075F SYST BP GE 130 - 139MM HG: CPT | Performed by: FAMILY MEDICINE

## 2022-06-15 PROCEDURE — G0439 PPPS, SUBSEQ VISIT: HCPCS | Performed by: FAMILY MEDICINE

## 2022-06-15 PROCEDURE — 3078F DIAST BP <80 MM HG: CPT | Performed by: FAMILY MEDICINE

## 2022-06-15 PROCEDURE — 1125F AMNT PAIN NOTED PAIN PRSNT: CPT | Performed by: FAMILY MEDICINE

## 2022-06-15 PROCEDURE — 96160 PT-FOCUSED HLTH RISK ASSMT: CPT | Performed by: FAMILY MEDICINE

## 2022-06-16 PROBLEM — R79.89 ABNORMAL THYROID BLOOD TEST: Status: ACTIVE | Noted: 2022-06-16

## 2022-07-06 DIAGNOSIS — Z79.4 TYPE 2 DIABETES MELLITUS WITH DIABETIC PERIPHERAL ANGIOPATHY WITHOUT GANGRENE, WITH LONG-TERM CURRENT USE OF INSULIN (HCC): ICD-10-CM

## 2022-07-06 DIAGNOSIS — E11.51 TYPE 2 DIABETES MELLITUS WITH DIABETIC PERIPHERAL ANGIOPATHY WITHOUT GANGRENE, WITH LONG-TERM CURRENT USE OF INSULIN (HCC): ICD-10-CM

## 2022-07-06 RX ORDER — INSULIN DEGLUDEC 200 U/ML
INJECTION, SOLUTION SUBCUTANEOUS
Qty: 45 ML | Refills: 1 | Status: SHIPPED | OUTPATIENT
Start: 2022-07-06

## 2022-07-06 RX ORDER — FLUTICASONE PROPIONATE 50 MCG
SPRAY, SUSPENSION (ML) NASAL
Qty: 48 G | Refills: 1 | Status: SHIPPED | OUTPATIENT
Start: 2022-07-06

## 2022-07-06 NOTE — TELEPHONE ENCOUNTER
Fluticasone: 3/12/21  Kolton Birch: 11/22/21    Future appt:    Last Appointment with provider:   Visit date not found  [place] [facility]:  6/15/2022  Dr.Cathy Miguel/ [facility]    CHOLESTEROL, TOTAL (mg/dL)   Date Value   06/13/2022 136     HDL CHOLESTEROL (mg/dL)   Date Value   06/13/2022 55     LDL-CHOLESTEROL (mg/dL (calc))   Date Value   06/13/2022 68     TRIGLYCERIDES (mg/dL)   Date Value   06/13/2022 58     Lab Results   Component Value Date     (H) 08/16/2019    A1C 6.1 (H) 06/13/2022     Lab Results   Component Value Date    TSH 6.12 (H) 06/13/2022       No follow-ups on file.

## 2022-08-29 DIAGNOSIS — I10 ESSENTIAL HYPERTENSION, BENIGN: ICD-10-CM

## 2022-08-29 RX ORDER — ATORVASTATIN CALCIUM 10 MG/1
10 TABLET, FILM COATED ORAL DAILY
Qty: 90 TABLET | Refills: 1 | Status: SHIPPED | OUTPATIENT
Start: 2022-08-29

## 2022-08-29 RX ORDER — LISINOPRIL 40 MG/1
40 TABLET ORAL DAILY
Qty: 90 TABLET | Refills: 1 | Status: SHIPPED | OUTPATIENT
Start: 2022-08-29

## 2022-08-29 RX ORDER — PEN NEEDLE, DIABETIC 32GX 5/32"
6 NEEDLE, DISPOSABLE MISCELLANEOUS DAILY
Qty: 600 EACH | Refills: 3 | Status: SHIPPED | OUTPATIENT
Start: 2022-08-29

## 2022-08-29 NOTE — TELEPHONE ENCOUNTER
Requesting refills:    Lisinopril  Atorvastatin  Vanessa Saha  BD Pen Needle Shira u/f (no substitute)    [place] in [place]

## 2022-08-29 NOTE — TELEPHONE ENCOUNTER
Lisinopril/Atorvastatin: 11/22/21  Pen [place]: 11/29/21  Remedios Best: Not Due for Refill    Future appt:    Last Appointment with provider:   Visit date not found  Last appointment at [facility]:  6/15/2022   [facility]    CHOLESTEROL, TOTAL (mg/dL)   Date Value   06/13/2022 136     HDL CHOLESTEROL (mg/dL)   Date Value   06/13/2022 55     LDL-CHOLESTEROL (mg/dL (calc))   Date Value   06/13/2022 68     TRIGLYCERIDES (mg/dL)   Date Value   06/13/2022 58     Lab Results   Component Value Date     (H) 08/16/2019    A1C 6.1 (H) 06/13/2022     Lab Results   Component Value Date    TSH 6.12 (H) 06/13/2022       No follow-ups on file.

## 2022-09-19 ENCOUNTER — TELEPHONE (OUTPATIENT)
Dept: FAMILY MEDICINE CLINIC | Facility: CLINIC | Age: 72
End: 2022-09-19

## 2022-09-19 NOTE — TELEPHONE ENCOUNTER
When Dr. Oumou Schumacher saw him in June, she sent orders to [facility] for a hemoglobin A1c, additional thyroid testing, basic metabolic profile, and hepatitis C testing. All of those orders were scheduled to be done in September.   The orders should already be at [facility].

## 2022-09-19 NOTE — TELEPHONE ENCOUNTER
Please fax order to [facility] for his A1C.   Also can you let him know when complete  Future Appointments   Date Time Provider Kamran Mcdonnell   5/15/2023  9:00 AM Ifrah Barrera MD [facility]

## 2022-09-19 NOTE — TELEPHONE ENCOUNTER
Requesting A1c to be sent to [email or web address].    Please advise if patient to have all the labs  [place] [facility] [address].

## 2022-09-21 LAB
BUN: 16 MG/DL (ref 7–25)
CALCIUM: 9.1 MG/DL (ref 8.6–10.3)
CARBON DIOXIDE: 29 MMOL/L (ref 20–32)
CHLORIDE: 103 MMOL/L (ref 98–110)
CREATININE: 0.78 MG/DL (ref 0.7–1.28)
EGFR: 95 ML/MIN/1.73M2
GLUCOSE: 96 MG/DL (ref 65–99)
HEMOGLOBIN A1C: 6.2 % OF TOTAL HGB
POTASSIUM: 4.1 MMOL/L (ref 3.5–5.3)
SIGNAL TO CUT-OFF: 0.28
SODIUM: 137 MMOL/L (ref 135–146)
T4, FREE: 1 NG/DL (ref 0.8–1.8)
TSH: 8.08 MIU/L (ref 0.4–4.5)

## 2022-10-17 ENCOUNTER — TELEPHONE (OUTPATIENT)
Dept: FAMILY MEDICINE CLINIC | Facility: CLINIC | Age: 72
End: 2022-10-17

## 2022-10-18 ENCOUNTER — PATIENT MESSAGE (OUTPATIENT)
Dept: FAMILY MEDICINE CLINIC | Facility: CLINIC | Age: 72
End: 2022-10-18

## 2022-10-18 NOTE — TELEPHONE ENCOUNTER
Will send Diabetes Letter that is not  Patient medical record.   Lolis Alcala CMA, 10/18/22, 8:12 AM

## 2022-10-18 NOTE — TELEPHONE ENCOUNTER
From: Dominique Zamora  To: Cira Myers  Sent: 10/17/2022 2:58 PM CDT  Subject: Insulin Pump    Good afternoon---  We received an order from [facility] for an Insulin Pump. Did you request this product? They are requesting Medical Records. Please advise.     Thank you-----

## 2022-10-18 NOTE — TELEPHONE ENCOUNTER
From: Lia Riley  To: Rudolph Sow  Sent: 10/17/2022 2:58 PM CDT  Subject: Insulin Pump    Good afternoon---  We received an order from [facility] for an Insulin Pump. Did you request this product? They are requesting Medical Records. Please advise.     Thank you-----

## 2022-12-01 ENCOUNTER — TELEPHONE (OUTPATIENT)
Dept: FAMILY MEDICINE CLINIC | Facility: CLINIC | Age: 72
End: 2022-12-01

## 2022-12-01 DIAGNOSIS — Z79.4 TYPE 2 DIABETES MELLITUS WITH DIABETIC PERIPHERAL ANGIOPATHY WITHOUT GANGRENE, WITH LONG-TERM CURRENT USE OF INSULIN (HCC): Primary | ICD-10-CM

## 2022-12-01 DIAGNOSIS — E11.51 TYPE 2 DIABETES MELLITUS WITH DIABETIC PERIPHERAL ANGIOPATHY WITHOUT GANGRENE, WITH LONG-TERM CURRENT USE OF INSULIN (HCC): Primary | ICD-10-CM

## 2022-12-01 NOTE — TELEPHONE ENCOUNTER
He is asking if you can send an order order to [facility] for his A1C?   Future Appointments   Date Time Provider Kamran Mcdonnell   5/15/2023  9:00 AM Florinda Sharma MD [facility]

## 2022-12-07 LAB — HEMOGLOBIN A1C: 6.1 % OF TOTAL HGB

## 2022-12-30 RX ORDER — FLUTICASONE PROPIONATE 50 MCG
SPRAY, SUSPENSION (ML) NASAL
Qty: 48 G | Refills: 1 | Status: SHIPPED | OUTPATIENT
Start: 2022-12-30

## 2022-12-30 NOTE — TELEPHONE ENCOUNTER
Last refill 7/6/22 #48g w/1 refill     Future appt: Your appointments     Date & Time Appointment Department [facility])    May 15, 2023  9:00 AM CDT MA Supervisit with Lin Yancey MD [address] [facility], Shari [place] ([facility])            [facility] [id number]  [phone]        Last Appointment with provider:   Visit date not found  [place] [facility]  Visit date not found  CHOLESTEROL, TOTAL (mg/dL)   Date Value   06/13/2022 136     HDL CHOLESTEROL (mg/dL)   Date Value   06/13/2022 55     LDL-CHOLESTEROL (mg/dL (calc))   Date Value   06/13/2022 68     TRIGLYCERIDES (mg/dL)   Date Value   06/13/2022 58     Lab Results   Component Value Date     (H) 08/16/2019    A1C 6.1 (H) 12/06/2022     Lab Results   Component Value Date    T4F 1.0 09/20/2022    TSH 8.08 (H) 09/20/2022       No follow-ups on file.

## 2023-03-15 ENCOUNTER — TELEPHONE (OUTPATIENT)
Dept: FAMILY MEDICINE CLINIC | Facility: CLINIC | Age: 73
End: 2023-03-15

## 2023-03-15 DIAGNOSIS — E11.51 TYPE 2 DIABETES MELLITUS WITH DIABETIC PERIPHERAL ANGIOPATHY WITHOUT GANGRENE, WITH LONG-TERM CURRENT USE OF INSULIN (HCC): Primary | ICD-10-CM

## 2023-03-15 DIAGNOSIS — Z79.4 TYPE 2 DIABETES MELLITUS WITH DIABETIC PERIPHERAL ANGIOPATHY WITHOUT GANGRENE, WITH LONG-TERM CURRENT USE OF INSULIN (HCC): Primary | ICD-10-CM

## 2023-03-20 ENCOUNTER — TELEPHONE (OUTPATIENT)
Dept: FAMILY MEDICINE CLINIC | Facility: CLINIC | Age: 73
End: 2023-03-20

## 2023-03-20 DIAGNOSIS — E11.51 TYPE 2 DIABETES MELLITUS WITH DIABETIC PERIPHERAL ANGIOPATHY WITHOUT GANGRENE, WITH LONG-TERM CURRENT USE OF INSULIN (HCC): Primary | ICD-10-CM

## 2023-03-20 DIAGNOSIS — Z79.4 TYPE 2 DIABETES MELLITUS WITH DIABETIC PERIPHERAL ANGIOPATHY WITHOUT GANGRENE, WITH LONG-TERM CURRENT USE OF INSULIN (HCC): Primary | ICD-10-CM

## 2023-03-20 DIAGNOSIS — I10 ESSENTIAL HYPERTENSION, BENIGN: ICD-10-CM

## 2023-03-22 ENCOUNTER — LABORATORY ENCOUNTER (OUTPATIENT)
Dept: LAB | Age: 73
End: 2023-03-22
Attending: FAMILY MEDICINE
Payer: MEDICARE

## 2023-03-22 LAB
EST. AVERAGE GLUCOSE BLD GHB EST-MCNC: 151 MG/DL (ref 68–126)
HBA1C MFR BLD: 6.9 % (ref ?–5.7)

## 2023-03-22 PROCEDURE — 83036 HEMOGLOBIN GLYCOSYLATED A1C: CPT | Performed by: FAMILY MEDICINE

## 2023-03-22 PROCEDURE — 36415 COLL VENOUS BLD VENIPUNCTURE: CPT | Performed by: FAMILY MEDICINE

## 2023-03-22 NOTE — TELEPHONE ENCOUNTER
Patient did not followup up with me. Appears to be following with pcp- MT.      Lab test done today per MT- rec await results and further recommendations     Future Appointments   Date Time Provider Kamran Mcdonnell   5/26/2023  9:00 AM Mika Alfred MD [facility]

## 2023-03-22 NOTE — TELEPHONE ENCOUNTER
Pt had px with CR 6/15/22. - pt was advised then to f/u on labs in  3months- pt had additional labs drawn 9/20/2. Pt's A1C drawn today is still pending. Pt was given order for diabetes education on 6/3/22-  Referral order is now closed. Diabetes Education office- called for updated order.     Routed to CR

## 2023-03-22 NOTE — TELEPHONE ENCOUNTER
Patient has not seen Dr. Bryanna Marie since 2021. Last seen for a physical, diabetes, HTN, etc with Dr. Edwardo Wayne on 6/15/2022.  Will route to her pool for new referral.

## 2023-03-23 NOTE — TELEPHONE ENCOUNTER
Ansley from Diabetes Education called back. Pt has appt tomorrow. Mica Giron states they have been seeing him for years, but Medicare requires updated referral.  A1C resulted is pending review. MT is out of office, routed to [facility] covering.

## 2023-05-17 ENCOUNTER — TELEPHONE (OUTPATIENT)
Dept: FAMILY MEDICINE CLINIC | Facility: CLINIC | Age: 73
End: 2023-05-17

## 2023-05-17 NOTE — TELEPHONE ENCOUNTER
Fax received from diabetes education. He is having low blood sugars in the early morning. Diabetes education is recommended decreasing the Tresiba dose and trying to avoid increasing doses of insulin at bedtime. Plan: His current dose of Romana Spitz is 32 units daily.

## 2023-05-17 NOTE — TELEPHONE ENCOUNTER
Patient informed of the below recommendations. States he has already been taking 32 units daily for a few years now. Please advise if he should decrease further.

## 2023-05-22 ENCOUNTER — TELEPHONE (OUTPATIENT)
Dept: FAMILY MEDICINE CLINIC | Facility: CLINIC | Age: 73
End: 2023-05-22

## 2023-05-22 DIAGNOSIS — E11.51 TYPE 2 DIABETES MELLITUS WITH DIABETIC PERIPHERAL ANGIOPATHY WITHOUT GANGRENE, WITH LONG-TERM CURRENT USE OF INSULIN (HCC): Primary | ICD-10-CM

## 2023-05-22 DIAGNOSIS — E55.9 VITAMIN D DEFICIENCY: ICD-10-CM

## 2023-05-22 DIAGNOSIS — R35.1 BENIGN PROSTATIC HYPERPLASIA WITH NOCTURIA: ICD-10-CM

## 2023-05-22 DIAGNOSIS — E78.00 HYPERCHOLESTEROLEMIA: ICD-10-CM

## 2023-05-22 DIAGNOSIS — Z79.4 TYPE 2 DIABETES MELLITUS WITH DIABETIC PERIPHERAL ANGIOPATHY WITHOUT GANGRENE, WITH LONG-TERM CURRENT USE OF INSULIN (HCC): Primary | ICD-10-CM

## 2023-05-22 DIAGNOSIS — N40.1 BENIGN PROSTATIC HYPERPLASIA WITH NOCTURIA: ICD-10-CM

## 2023-05-22 DIAGNOSIS — I10 ESSENTIAL HYPERTENSION, BENIGN: ICD-10-CM

## 2023-05-22 DIAGNOSIS — I73.9 PAD (PERIPHERAL ARTERY DISEASE) (HCC): ICD-10-CM

## 2023-05-22 NOTE — TELEPHONE ENCOUNTER
Future appt: Your appointments     Date & Time Appointment Department [facility])    May 24, 2023  9:30 AM CDT Laboratory Visit with REF 1 Layla Porter, [place] Gomez ([facility] [place])        May 26, 2023  9:00 AM CDT MA Supervisit with Tamir Spring MD [address], [place] ([place])            Jason Gruber Ariel Moron  [facility] [place]  [address] [facility] [address]  [phone] [address] [facility], [facility]  [facility] [address] [phone]  [phone]        Last Appointment with provider:   Visit date not found  Last appointment at [facility] [place]:  Visit date not found  CHOLESTEROL, TOTAL (mg/dL)   Date Value   06/13/2022 136     HDL CHOLESTEROL (mg/dL)   Date Value   06/13/2022 55     LDL-CHOLESTEROL (mg/dL (calc))   Date Value   06/13/2022 68     TRIGLYCERIDES (mg/dL)   Date Value   06/13/2022 58     Lab Results   Component Value Date     (H) 03/22/2023    A1C 6.9 (H) 03/22/2023     Lab Results   Component Value Date    T4F 1.0 09/20/2022    TSH 8.08 (H) 09/20/2022       No follow-ups on file.

## 2023-05-24 ENCOUNTER — LABORATORY ENCOUNTER (OUTPATIENT)
Dept: LAB | Age: 73
End: 2023-05-24
Attending: FAMILY MEDICINE
Payer: MEDICARE

## 2023-05-24 DIAGNOSIS — R35.1 BENIGN PROSTATIC HYPERPLASIA WITH NOCTURIA: ICD-10-CM

## 2023-05-24 DIAGNOSIS — I73.9 PAD (PERIPHERAL ARTERY DISEASE) (HCC): ICD-10-CM

## 2023-05-24 DIAGNOSIS — E11.51 TYPE 2 DIABETES MELLITUS WITH DIABETIC PERIPHERAL ANGIOPATHY WITHOUT GANGRENE, WITH LONG-TERM CURRENT USE OF INSULIN (HCC): ICD-10-CM

## 2023-05-24 DIAGNOSIS — N40.1 BENIGN PROSTATIC HYPERPLASIA WITH NOCTURIA: ICD-10-CM

## 2023-05-24 DIAGNOSIS — Z79.4 TYPE 2 DIABETES MELLITUS WITH DIABETIC PERIPHERAL ANGIOPATHY WITHOUT GANGRENE, WITH LONG-TERM CURRENT USE OF INSULIN (HCC): ICD-10-CM

## 2023-05-24 DIAGNOSIS — E78.00 HYPERCHOLESTEROLEMIA: ICD-10-CM

## 2023-05-24 DIAGNOSIS — I10 ESSENTIAL HYPERTENSION, BENIGN: ICD-10-CM

## 2023-05-24 DIAGNOSIS — E55.9 VITAMIN D DEFICIENCY: ICD-10-CM

## 2023-05-24 LAB
ALBUMIN SERPL-MCNC: 3.7 G/DL (ref 3.4–5)
ALBUMIN/GLOB SERPL: 1 {RATIO} (ref 1–2)
ALP LIVER SERPL-CCNC: 70 U/L
ALT SERPL-CCNC: 41 U/L
ANION GAP SERPL CALC-SCNC: 3 MMOL/L (ref 0–18)
AST SERPL-CCNC: 20 U/L (ref 15–37)
BASOPHILS # BLD AUTO: 0.04 X10(3) UL (ref 0–0.2)
BASOPHILS NFR BLD AUTO: 0.6 %
BILIRUB SERPL-MCNC: 0.5 MG/DL (ref 0.1–2)
BUN BLD-MCNC: 14 MG/DL (ref 7–18)
CALCIUM BLD-MCNC: 9.4 MG/DL (ref 8.5–10.1)
CHLORIDE SERPL-SCNC: 105 MMOL/L (ref 98–112)
CHOLEST SERPL-MCNC: 138 MG/DL (ref ?–200)
CO2 SERPL-SCNC: 28 MMOL/L (ref 21–32)
COMPLEXED PSA SERPL-MCNC: 2.15 NG/ML (ref ?–4)
CREAT BLD-MCNC: 0.9 MG/DL
CREAT UR-SCNC: 55.7 MG/DL
EOSINOPHIL # BLD AUTO: 0.14 X10(3) UL (ref 0–0.7)
EOSINOPHIL NFR BLD AUTO: 1.9 %
ERYTHROCYTE [DISTWIDTH] IN BLOOD BY AUTOMATED COUNT: 13.4 %
EST. AVERAGE GLUCOSE BLD GHB EST-MCNC: 143 MG/DL (ref 68–126)
FASTING PATIENT LIPID ANSWER: YES
FASTING STATUS PATIENT QL REPORTED: YES
GFR SERPLBLD BASED ON 1.73 SQ M-ARVRAT: 91 ML/MIN/1.73M2 (ref 60–?)
GLOBULIN PLAS-MCNC: 3.6 G/DL (ref 2.8–4.4)
GLUCOSE BLD-MCNC: 138 MG/DL (ref 70–99)
HBA1C MFR BLD: 6.6 % (ref ?–5.7)
HCT VFR BLD AUTO: 47.5 %
HDLC SERPL-MCNC: 56 MG/DL (ref 40–59)
HGB BLD-MCNC: 16.2 G/DL
IMM GRANULOCYTES # BLD AUTO: 0.01 X10(3) UL (ref 0–1)
IMM GRANULOCYTES NFR BLD: 0.1 %
LDLC SERPL CALC-MCNC: 65 MG/DL (ref ?–100)
LYMPHOCYTES # BLD AUTO: 1.29 X10(3) UL (ref 1–4)
LYMPHOCYTES NFR BLD AUTO: 17.9 %
MCH RBC QN AUTO: 32.4 PG (ref 26–34)
MCHC RBC AUTO-ENTMCNC: 34.1 G/DL (ref 31–37)
MCV RBC AUTO: 95 FL
MICROALBUMIN UR-MCNC: 0.55 MG/DL
MICROALBUMIN/CREAT 24H UR-RTO: 9.9 UG/MG (ref ?–30)
MONOCYTES # BLD AUTO: 0.47 X10(3) UL (ref 0.1–1)
MONOCYTES NFR BLD AUTO: 6.5 %
NEUTROPHILS # BLD AUTO: 5.24 X10 (3) UL (ref 1.5–7.7)
NEUTROPHILS # BLD AUTO: 5.24 X10(3) UL (ref 1.5–7.7)
NEUTROPHILS NFR BLD AUTO: 73 %
NONHDLC SERPL-MCNC: 82 MG/DL (ref ?–130)
OSMOLALITY SERPL CALC.SUM OF ELEC: 285 MOSM/KG (ref 275–295)
PLATELET # BLD AUTO: 299 10(3)UL (ref 150–450)
POTASSIUM SERPL-SCNC: 4.8 MMOL/L (ref 3.5–5.1)
PROT SERPL-MCNC: 7.3 G/DL (ref 6.4–8.2)
RBC # BLD AUTO: 5 X10(6)UL
SODIUM SERPL-SCNC: 136 MMOL/L (ref 136–145)
TRIGL SERPL-MCNC: 87 MG/DL (ref 30–149)
TSI SER-ACNC: 4.75 MIU/ML (ref 0.36–3.74)
URATE SERPL-MCNC: 4.7 MG/DL
VIT D+METAB SERPL-MCNC: 25 NG/ML (ref 30–100)
VLDLC SERPL CALC-MCNC: 13 MG/DL (ref 0–30)
WBC # BLD AUTO: 7.2 X10(3) UL (ref 4–11)

## 2023-05-24 PROCEDURE — 84550 ASSAY OF BLOOD/URIC ACID: CPT | Performed by: FAMILY MEDICINE

## 2023-05-24 PROCEDURE — 80053 COMPREHEN METABOLIC PANEL: CPT | Performed by: FAMILY MEDICINE

## 2023-05-24 PROCEDURE — 82043 UR ALBUMIN QUANTITATIVE: CPT | Performed by: FAMILY MEDICINE

## 2023-05-24 PROCEDURE — 36415 COLL VENOUS BLD VENIPUNCTURE: CPT

## 2023-05-24 PROCEDURE — 83036 HEMOGLOBIN GLYCOSYLATED A1C: CPT | Performed by: FAMILY MEDICINE

## 2023-05-24 PROCEDURE — 85025 COMPLETE CBC W/AUTO DIFF WBC: CPT | Performed by: FAMILY MEDICINE

## 2023-05-24 PROCEDURE — 80061 LIPID PANEL: CPT | Performed by: FAMILY MEDICINE

## 2023-05-24 PROCEDURE — 82570 ASSAY OF URINE CREATININE: CPT | Performed by: FAMILY MEDICINE

## 2023-05-24 PROCEDURE — 84443 ASSAY THYROID STIM HORMONE: CPT | Performed by: FAMILY MEDICINE

## 2023-05-24 PROCEDURE — 82306 VITAMIN D 25 HYDROXY: CPT

## 2023-05-26 ENCOUNTER — TELEPHONE (OUTPATIENT)
Dept: FAMILY MEDICINE CLINIC | Facility: CLINIC | Age: 73
End: 2023-05-26

## 2023-05-26 ENCOUNTER — OFFICE VISIT (OUTPATIENT)
Dept: FAMILY MEDICINE CLINIC | Facility: CLINIC | Age: 73
End: 2023-05-26
Payer: MEDICARE

## 2023-05-26 VITALS
RESPIRATION RATE: 16 BRPM | DIASTOLIC BLOOD PRESSURE: 80 MMHG | BODY MASS INDEX: 27.2 KG/M2 | SYSTOLIC BLOOD PRESSURE: 134 MMHG | HEIGHT: 69 IN | TEMPERATURE: 97 F | OXYGEN SATURATION: 98 % | HEART RATE: 78 BPM | WEIGHT: 183.63 LBS

## 2023-05-26 DIAGNOSIS — E78.00 HYPERCHOLESTEROLEMIA: ICD-10-CM

## 2023-05-26 DIAGNOSIS — Z00.00 ENCOUNTER FOR ANNUAL HEALTH EXAMINATION: Primary | ICD-10-CM

## 2023-05-26 DIAGNOSIS — R35.1 BENIGN PROSTATIC HYPERPLASIA WITH NOCTURIA: ICD-10-CM

## 2023-05-26 DIAGNOSIS — G89.29 CHRONIC MIDLINE LOW BACK PAIN WITHOUT SCIATICA: ICD-10-CM

## 2023-05-26 DIAGNOSIS — E11.51 TYPE 2 DIABETES MELLITUS WITH DIABETIC PERIPHERAL ANGIOPATHY WITHOUT GANGRENE, WITH LONG-TERM CURRENT USE OF INSULIN (HCC): ICD-10-CM

## 2023-05-26 DIAGNOSIS — R79.89 ABNORMAL THYROID BLOOD TEST: ICD-10-CM

## 2023-05-26 DIAGNOSIS — M54.50 CHRONIC MIDLINE LOW BACK PAIN WITHOUT SCIATICA: ICD-10-CM

## 2023-05-26 DIAGNOSIS — M17.0 PRIMARY OSTEOARTHRITIS OF BOTH KNEES: ICD-10-CM

## 2023-05-26 DIAGNOSIS — I10 ESSENTIAL HYPERTENSION, BENIGN: ICD-10-CM

## 2023-05-26 DIAGNOSIS — F17.200 TOBACCO DEPENDENCE: ICD-10-CM

## 2023-05-26 DIAGNOSIS — Z79.4 TYPE 2 DIABETES MELLITUS WITH DIABETIC PERIPHERAL ANGIOPATHY WITHOUT GANGRENE, WITH LONG-TERM CURRENT USE OF INSULIN (HCC): ICD-10-CM

## 2023-05-26 DIAGNOSIS — J30.89 NON-SEASONAL ALLERGIC RHINITIS DUE TO OTHER ALLERGIC TRIGGER: ICD-10-CM

## 2023-05-26 DIAGNOSIS — N40.1 BENIGN PROSTATIC HYPERPLASIA WITH NOCTURIA: ICD-10-CM

## 2023-05-26 DIAGNOSIS — R09.89 RIGHT CAROTID BRUIT: ICD-10-CM

## 2023-05-26 DIAGNOSIS — I73.9 PAD (PERIPHERAL ARTERY DISEASE) (HCC): ICD-10-CM

## 2023-05-26 RX ORDER — INSULIN ASPART 100 [IU]/ML
INJECTION, SOLUTION INTRAVENOUS; SUBCUTANEOUS
Qty: 45 ML | Refills: 3 | Status: SHIPPED | OUTPATIENT
Start: 2023-05-26

## 2023-05-26 RX ORDER — FLUTICASONE PROPIONATE 50 MCG
SPRAY, SUSPENSION (ML) NASAL
Qty: 48 G | Refills: 3 | Status: SHIPPED | OUTPATIENT
Start: 2023-05-26

## 2023-05-26 RX ORDER — ATORVASTATIN CALCIUM 10 MG/1
10 TABLET, FILM COATED ORAL DAILY
Qty: 90 TABLET | Refills: 3 | Status: SHIPPED | OUTPATIENT
Start: 2023-05-26

## 2023-05-26 RX ORDER — INSULIN DEGLUDEC 200 U/ML
32 INJECTION, SOLUTION SUBCUTANEOUS DAILY
Qty: 45 ML | Refills: 3 | Status: SHIPPED | OUTPATIENT
Start: 2023-05-26

## 2023-05-26 RX ORDER — LISINOPRIL 40 MG/1
40 TABLET ORAL DAILY
Qty: 90 TABLET | Refills: 3 | Status: SHIPPED | OUTPATIENT
Start: 2023-05-26

## 2023-05-26 RX ORDER — INSULIN LISPRO 100 [IU]/ML
INJECTION, SOLUTION INTRAVENOUS; SUBCUTANEOUS
Qty: 45 ML | Refills: 3 | Status: SHIPPED | OUTPATIENT
Start: 2023-05-26 | End: 2023-05-26

## 2023-05-26 RX ORDER — PEN NEEDLE, DIABETIC 32GX 5/32"
6 NEEDLE, DISPOSABLE MISCELLANEOUS DAILY
Qty: 600 EACH | Refills: 3 | Status: SHIPPED | OUTPATIENT
Start: 2023-05-26

## 2023-06-12 ENCOUNTER — TELEPHONE (OUTPATIENT)
Dept: FAMILY MEDICINE CLINIC | Facility: CLINIC | Age: 73
End: 2023-06-12

## 2023-06-12 NOTE — TELEPHONE ENCOUNTER
Phone call - Karyn Doe has been congested with a runny nose. No fever. He is taking Claritin every day. Impression:  Allergic rhinitis. Plan:  Stop Claritin. Take Zyrtec every day. Use Astelin nose spray as well as Flonase. Call if not better in 3-4 days.

## 2023-07-19 RX ORDER — PEN NEEDLE, DIABETIC 32GX 5/32"
4 NEEDLE, DISPOSABLE MISCELLANEOUS
COMMUNITY
End: 2023-07-19

## 2023-07-19 RX ORDER — PEN NEEDLE, DIABETIC 32GX 5/32"
NEEDLE, DISPOSABLE MISCELLANEOUS
Qty: 500 EACH | Refills: 3 | Status: SHIPPED | OUTPATIENT
Start: 2023-07-19

## 2023-07-19 RX ORDER — PEN NEEDLE, DIABETIC 32GX 5/32"
6 NEEDLE, DISPOSABLE MISCELLANEOUS DAILY
Qty: 600 EACH | Refills: 3 | Status: SHIPPED | OUTPATIENT
Start: 2023-07-19 | End: 2023-07-19 | Stop reason: ALTCHOICE

## 2023-07-19 NOTE — TELEPHONE ENCOUNTER
Future appt:    Last Appointment with provider:   5/26/2023  Last appointment [facility]:  5/26/2023  Cholesterol, Total (mg/dL)   Date Value   05/24/2023 138     CHOLESTEROL, TOTAL (mg/dL)   Date Value   06/13/2022 136     HDL Cholesterol (mg/dL)   Date Value   05/24/2023 56     HDL CHOLESTEROL (mg/dL)   Date Value   06/13/2022 55     LDL Cholesterol (mg/dL)   Date Value   05/24/2023 65     LDL-CHOLESTEROL (mg/dL (calc))   Date Value   06/13/2022 68     Triglycerides (mg/dL)   Date Value   05/24/2023 87     TRIGLYCERIDES (mg/dL)   Date Value   06/13/2022 58     Lab Results   Component Value Date     (H) 05/24/2023    A1C 6.6 (H) 05/24/2023     Lab Results   Component Value Date    T4F 1.0 09/20/2022    TSH 4.750 (H) 05/24/2023       No follow-ups on file.

## 2023-09-27 RX ORDER — INSULIN ASPART 100 [IU]/ML
INJECTION, SOLUTION INTRAVENOUS; SUBCUTANEOUS
Qty: 45 ML | Refills: 1 | Status: SHIPPED | OUTPATIENT
Start: 2023-09-27

## 2023-09-27 NOTE — TELEPHONE ENCOUNTER
Humalo23  Return in 1 year (on 2024). Future appt:    Last Appointment with provider:   2023  Last appointment at [facility] [place]:  2023  Cholesterol, Total (mg/dL)   Date Value   2023 138     CHOLESTEROL, TOTAL (mg/dL)   Date Value   2022 136     HDL Cholesterol (mg/dL)   Date Value   2023 56     HDL CHOLESTEROL (mg/dL)   Date Value   2022 55     LDL Cholesterol (mg/dL)   Date Value   2023 65     LDL-CHOLESTEROL (mg/dL (calc))   Date Value   2022 68     Triglycerides (mg/dL)   Date Value   2023 87     TRIGLYCERIDES (mg/dL)   Date Value   2022 58     Lab Results   Component Value Date     (H) 2023    A1C 6.6 (H) 2023     Lab Results   Component Value Date    T4F 1.0 2022    TSH 4.750 (H) 2023       No follow-ups on file.

## 2023-09-28 ENCOUNTER — TELEPHONE (OUTPATIENT)
Dept: FAMILY MEDICINE CLINIC | Facility: CLINIC | Age: 73
End: 2023-09-28

## 2023-09-28 RX ORDER — INSULIN LISPRO 100 [IU]/ML
INJECTION, SOLUTION INTRAVENOUS; SUBCUTANEOUS
Qty: 1 EACH | Refills: 0 | Status: SHIPPED | OUTPATIENT
Start: 2023-09-28

## 2023-09-28 NOTE — TELEPHONE ENCOUNTER
Pt notified    States he didn't realize that the script changed due to insurance but recently he's been noticing that he has red eyes and even when he's dosing with meals his sugars stay high for a couple of hours before going down. (Confirmed with YUNIOR wheeler)Advised patient to contact the diabetic office he sees to get recommendations from them with doctor rosa out today and us being closed till next week. Pt to call back if any issues.     Let pt know I will still send to covering provider

## 2023-09-28 NOTE — TELEPHONE ENCOUNTER
Pt called insurance states they would cover Humalog if pt not having good reaction to Novalog.  Would like to go back on Humalog

## 2023-09-28 NOTE — TELEPHONE ENCOUNTER
5/26/23 telenote states     Humalog not insurance covered/  Request change to [place] [employer]

## 2023-09-28 NOTE — TELEPHONE ENCOUNTER
Needs a refill on his Humalog insulin. Patient doesn't use Novolog. Pharmacy:  [facility]  No future appointments.

## 2024-06-19 NOTE — TELEPHONE ENCOUNTER
The recommendation from diabetes education was to continue [place] 32 units daily. They recommended changing the evening dose of short acting insulin to follow a different carb ratio. They also recommended trying to avoid stacking the insulin by giving the 2 types of insulin right after each other. I agree with these recommendations. PAST SURGICAL HISTORY:  No significant past surgical history

## 2024-12-22 NOTE — TELEPHONE ENCOUNTER
Future appt:     Your appointments     Date & Time Appointment Department [facility])    Nov 20, 2019  9:00 AM CST Follow up - Extended with Lilly Venegas MD [address] [place] [address], [facility] ([facility])            [place] 24-Dec-2024

## 2025-02-12 NOTE — TELEPHONE ENCOUNTER
[place] [facility]    Lisinopril: 6/1/21  Atorvastatin: 3/12/21  Insulin: 8/27/21  Tresiba: 3/12/21    Patient is currently in [place]--    Future appt:    Last Appointment with provider: 5/21/21    [facility]:  Doctor Holley Simmons DISPLAY PLAN FREE TEXT

## 2025-06-22 NOTE — TELEPHONE ENCOUNTER
Tried PA- says its not needed, called pharmacy states its ready for  Spontaneous, unlabored and symmetrical

## (undated) NOTE — MR AVS SNAPSHOT
[address]  [place] [address] [address] [phone]  [phone]               Thank you for choosing us for your health care visit with Diego Crouch MD.  We are glad to serve you and happy to provide you with this summary of • Family history of diabetes   • Age 72 years or older   • History of gestational diabetes or birth of baby weighing more than 9 pounds     Covered at least every 3 years,           GLUCOSE (mg/dL)   Date Value   05/22/2017 165*   ---------- Medicare cover PSA  Annually to 75 then as needed or JENNIFER annually to 75 PSA due on 05/22/2019  No results found for this or any previous visit.    Annually (age 48 or over), JENNIFER not paid separately when covered E/M service is provided on same date    Immunizations      I anyone to review and print using their home computer and printer. (the forms are also available in [place]  [email or web address]  This link also has information from the [facility] regarding Advance Directives.        Allergies as of M information, go to [email or web address] and click on the Sign Up Now link in the [employer] box. Enter your [employer] Activation Code exactly as it appears below along with your Zip Code and Date of Birth to complete the sign-up process.  If you do You don’t need to join a gym. Home exercises work great.  Put more priority on exercise in your life                    Visit [facility] online at  [facility].tn

## (undated) NOTE — MR AVS SNAPSHOT
[address]  [place] [address] [phone]  [phone]               Thank you for choosing us for your health care visit with Edilson Adames MD.  We are glad to serve you and happy to provide you with this summary of Generic drug:  Insulin Pen Needle           Fluticasone Propionate 50 MCG/ACT Susp   2 sprays by Each Nare route daily.    Commonly known as:  FLONASE           HUMALOG KWIKPEN 100 UNIT/ML Sopn   Generic drug:  Insulin Lispro   Inject 45 Units into the skin